# Patient Record
Sex: MALE
[De-identification: names, ages, dates, MRNs, and addresses within clinical notes are randomized per-mention and may not be internally consistent; named-entity substitution may affect disease eponyms.]

---

## 2022-09-26 PROBLEM — Z00.00 ENCOUNTER FOR PREVENTIVE HEALTH EXAMINATION: Status: ACTIVE | Noted: 2022-09-26

## 2022-09-27 ENCOUNTER — NON-APPOINTMENT (OUTPATIENT)
Age: 52
End: 2022-09-27

## 2022-09-27 ENCOUNTER — APPOINTMENT (OUTPATIENT)
Dept: CARDIOLOGY | Facility: CLINIC | Age: 52
End: 2022-09-27

## 2022-09-27 VITALS
DIASTOLIC BLOOD PRESSURE: 80 MMHG | BODY MASS INDEX: 34.25 KG/M2 | HEIGHT: 68 IN | WEIGHT: 226 LBS | OXYGEN SATURATION: 99 % | SYSTOLIC BLOOD PRESSURE: 148 MMHG | HEART RATE: 89 BPM

## 2022-09-27 DIAGNOSIS — I10 ESSENTIAL (PRIMARY) HYPERTENSION: ICD-10-CM

## 2022-09-27 PROCEDURE — 93000 ELECTROCARDIOGRAM COMPLETE: CPT

## 2022-09-27 PROCEDURE — 99204 OFFICE O/P NEW MOD 45 MIN: CPT | Mod: 25

## 2022-09-27 RX ORDER — AMLODIPINE BESYLATE 5 MG/1
5 TABLET ORAL DAILY
Refills: 0 | Status: ACTIVE | COMMUNITY
Start: 2022-09-27

## 2022-09-27 NOTE — DISCUSSION/SUMMARY
[FreeTextEntry1] : This is a 52-year-old gentleman with a past medical history significant for hypertension who presents to the office for cardiac evaluation.  His BP is usually controlled on amlodipine 5 QD, and he will continue it as written.  I am referring him for an echocardiogram to assess His cardiac structure and function, as well as an exercise stress test to assess His hemodynamic response to exercise, and to assess for the presence of obstructive coronary artery disease.  We discussed the benefits of a healthy diet, regular exercise and physical activity, and weight loss in detail.  I will call him with the results, and to discuss any necessary followup.

## 2022-09-27 NOTE — HISTORY OF PRESENT ILLNESS
[FreeTextEntry1] : This is a 52-year-old gentleman with a past medical history significant for hypertension who presents to the office for cardiac evaluation.  He reports just being diagnosed with hypertension, and being started on amlodipine.  He had been on 10 mg dose, but his lower extremity started to swell.  This was reduced to 5 mg, and he has no further lower extremity edema.  He is denying any cardiac symptoms, with no chest pain, dyspnea, PND, orthopnea, lower extremity swelling, dizziness, or syncope.  He is trying to eat better, and lose weight.  He is starting an exercise regimen.  His blood pressure is a bit high today, but he did not take his amlodipine yet.

## 2022-10-12 ENCOUNTER — APPOINTMENT (OUTPATIENT)
Dept: CARDIOLOGY | Facility: CLINIC | Age: 52
End: 2022-10-12

## 2022-10-12 PROCEDURE — 93015 CV STRESS TEST SUPVJ I&R: CPT

## 2022-10-12 PROCEDURE — 93306 TTE W/DOPPLER COMPLETE: CPT

## 2024-06-05 ENCOUNTER — NON-APPOINTMENT (OUTPATIENT)
Age: 54
End: 2024-06-05

## 2024-06-05 ENCOUNTER — APPOINTMENT (OUTPATIENT)
Dept: OPHTHALMOLOGY | Facility: CLINIC | Age: 54
End: 2024-06-05
Payer: COMMERCIAL

## 2024-06-05 PROCEDURE — 92020 GONIOSCOPY: CPT

## 2024-06-05 PROCEDURE — 99204 OFFICE O/P NEW MOD 45 MIN: CPT

## 2024-06-05 PROCEDURE — 76514 ECHO EXAM OF EYE THICKNESS: CPT

## 2024-06-05 PROCEDURE — 76513 OPH US DX ANT SGM US UNI/BI: CPT | Mod: LT

## 2024-06-05 PROCEDURE — 92133 CPTRZD OPH DX IMG PST SGM ON: CPT

## 2024-07-01 ENCOUNTER — NON-APPOINTMENT (OUTPATIENT)
Age: 54
End: 2024-07-01

## 2024-07-01 ENCOUNTER — APPOINTMENT (OUTPATIENT)
Dept: OPHTHALMOLOGY | Facility: CLINIC | Age: 54
End: 2024-07-01
Payer: COMMERCIAL

## 2024-07-01 PROCEDURE — 66761 REVISION OF IRIS: CPT | Mod: RT

## 2024-07-29 ENCOUNTER — NON-APPOINTMENT (OUTPATIENT)
Age: 54
End: 2024-07-29

## 2024-07-29 ENCOUNTER — APPOINTMENT (OUTPATIENT)
Dept: OPHTHALMOLOGY | Facility: CLINIC | Age: 54
End: 2024-07-29
Payer: COMMERCIAL

## 2024-07-29 PROCEDURE — 66761 REVISION OF IRIS: CPT | Mod: LT

## 2024-08-28 ENCOUNTER — NON-APPOINTMENT (OUTPATIENT)
Age: 54
End: 2024-08-28

## 2024-08-28 ENCOUNTER — APPOINTMENT (OUTPATIENT)
Dept: OPHTHALMOLOGY | Facility: CLINIC | Age: 54
End: 2024-08-28
Payer: COMMERCIAL

## 2024-08-28 PROCEDURE — 92020 GONIOSCOPY: CPT

## 2024-08-28 PROCEDURE — 92012 INTRM OPH EXAM EST PATIENT: CPT

## 2024-10-07 ENCOUNTER — RESULT REVIEW (OUTPATIENT)
Age: 54
End: 2024-10-07

## 2025-06-09 ENCOUNTER — DOCTOR'S OFFICE (OUTPATIENT)
Facility: LOCATION | Age: 55
Setting detail: OPHTHALMOLOGY
End: 2025-06-09
Payer: MEDICAID

## 2025-06-09 DIAGNOSIS — H25.13: ICD-10-CM

## 2025-06-09 DIAGNOSIS — H40.033: ICD-10-CM

## 2025-06-09 PROCEDURE — 92004 COMPRE OPH EXAM NEW PT 1/>: CPT | Performed by: OPHTHALMOLOGY

## 2025-06-09 ASSESSMENT — REFRACTION_CURRENTRX
OD_OVR_VA: 20/
OD_VPRISM_DIRECTION: PROGS
OD_ADD: +2.25
OD_SPHERE: +1.50
OS_ADD: +2.25
OD_CYLINDER: +0.50
OD_AXIS: 005
OS_SPHERE: +1.75
OS_OVR_VA: 20/
OS_VPRISM_DIRECTION: PROGS

## 2025-06-09 ASSESSMENT — KERATOMETRY
OD_K1POWER_DIOPTERS: 43.75
OD_AXISANGLE_DEGREES: 107
OS_AXISANGLE_DEGREES: 069
OD_K2POWER_DIOPTERS: 44.25
OS_K2POWER_DIOPTERS: 44.00
OS_K1POWER_DIOPTERS: 43.25

## 2025-06-09 ASSESSMENT — VISUAL ACUITY
OS_BCVA: 20/20
OD_BCVA: 20/200-1

## 2025-06-09 ASSESSMENT — REFRACTION_MANIFEST
OS_CYLINDER: -0.75
OS_SPHERE: +2.75
OS_VA1: 20/100
OS_AXIS: 100

## 2025-06-09 ASSESSMENT — REFRACTION_AUTOREFRACTION
OD_CYLINDER: +1.00
OD_SPHERE: +1.75
OS_SPHERE: +2.00
OD_AXIS: 175
OS_CYLINDER: +0.75
OS_AXIS: 010

## 2025-06-09 ASSESSMENT — CONFRONTATIONAL VISUAL FIELD TEST (CVF)
OD_FINDINGS: FULL
OS_FINDINGS: FULL

## 2025-06-12 ENCOUNTER — DOCTOR'S OFFICE (OUTPATIENT)
Facility: LOCATION | Age: 55
Setting detail: OPHTHALMOLOGY
End: 2025-06-12
Payer: MEDICAID

## 2025-06-12 DIAGNOSIS — H35.363: ICD-10-CM

## 2025-06-12 PROBLEM — H40.033 NARROW ANGLE; BOTH EYES: Status: ACTIVE | Noted: 2025-06-09

## 2025-06-12 PROBLEM — H35.30 MACULAR DEGENERATION UNSPECIFIED ; LEFT EYE: Status: ACTIVE | Noted: 2025-06-09

## 2025-06-12 PROBLEM — H54.52A2 LOW VISION LEFT EYE CATEGORY 2, NORMAL VISION RIGHT EYE: Status: ACTIVE | Noted: 2025-06-12

## 2025-06-12 PROBLEM — H25.13 CATARACT SENILE NUCLEAR SCLEROSIS; BOTH EYES: Status: ACTIVE | Noted: 2025-06-09

## 2025-06-12 PROCEDURE — 92012 INTRM OPH EXAM EST PATIENT: CPT | Performed by: OPHTHALMOLOGY

## 2025-06-12 PROCEDURE — 92134 CPTRZ OPH DX IMG PST SGM RTA: CPT | Performed by: OPHTHALMOLOGY

## 2025-06-12 ASSESSMENT — REFRACTION_AUTOREFRACTION
OD_CYLINDER: +1.00
OD_SPHERE: +1.75
OS_SPHERE: +2.00
OD_AXIS: 175
OS_AXIS: 010
OS_CYLINDER: +0.75

## 2025-06-12 ASSESSMENT — KERATOMETRY
OS_AXISANGLE_DEGREES: 069
OS_K1POWER_DIOPTERS: 43.25
OD_K1POWER_DIOPTERS: 43.75
OS_K2POWER_DIOPTERS: 44.00
OD_AXISANGLE_DEGREES: 107
OD_K2POWER_DIOPTERS: 44.25

## 2025-06-12 ASSESSMENT — VISUAL ACUITY
OD_BCVA: 20/200-1
OS_BCVA: 20/20

## 2025-07-14 ENCOUNTER — INPATIENT (INPATIENT)
Facility: HOSPITAL | Age: 55
LOS: 2 days | Discharge: HOME | DRG: 199 | End: 2025-07-17
Attending: SPECIALIST | Admitting: SPECIALIST
Payer: COMMERCIAL

## 2025-07-14 VITALS
HEART RATE: 76 BPM | SYSTOLIC BLOOD PRESSURE: 135 MMHG | WEIGHT: 219.14 LBS | OXYGEN SATURATION: 100 % | DIASTOLIC BLOOD PRESSURE: 85 MMHG | RESPIRATION RATE: 18 BRPM | TEMPERATURE: 99 F

## 2025-07-14 DIAGNOSIS — I10 ESSENTIAL (PRIMARY) HYPERTENSION: ICD-10-CM

## 2025-07-14 LAB
ALBUMIN SERPL ELPH-MCNC: 4.5 G/DL — SIGNIFICANT CHANGE UP (ref 3.3–5)
ALP SERPL-CCNC: 76 U/L — SIGNIFICANT CHANGE UP (ref 40–120)
ALT FLD-CCNC: 70 U/L — SIGNIFICANT CHANGE UP (ref 12–78)
ANION GAP SERPL CALC-SCNC: 8 MMOL/L — SIGNIFICANT CHANGE UP (ref 5–17)
APTT BLD: 29.4 SEC — SIGNIFICANT CHANGE UP (ref 26.1–36.8)
AST SERPL-CCNC: 53 U/L — HIGH (ref 15–37)
BASOPHILS # BLD AUTO: 0.04 K/UL — SIGNIFICANT CHANGE UP (ref 0–0.2)
BASOPHILS NFR BLD AUTO: 0.8 % — SIGNIFICANT CHANGE UP (ref 0–2)
BILIRUB SERPL-MCNC: 0.4 MG/DL — SIGNIFICANT CHANGE UP (ref 0.2–1.2)
BUN SERPL-MCNC: 16 MG/DL — SIGNIFICANT CHANGE UP (ref 7–23)
CALCIUM SERPL-MCNC: 9.4 MG/DL — SIGNIFICANT CHANGE UP (ref 8.5–10.1)
CHLORIDE SERPL-SCNC: 104 MMOL/L — SIGNIFICANT CHANGE UP (ref 96–108)
CO2 SERPL-SCNC: 24 MMOL/L — SIGNIFICANT CHANGE UP (ref 22–31)
CREAT SERPL-MCNC: 1.31 MG/DL — HIGH (ref 0.5–1.3)
EGFR: 65 ML/MIN/1.73M2 — SIGNIFICANT CHANGE UP
EGFR: 65 ML/MIN/1.73M2 — SIGNIFICANT CHANGE UP
EOSINOPHIL # BLD AUTO: 0.16 K/UL — SIGNIFICANT CHANGE UP (ref 0–0.5)
EOSINOPHIL NFR BLD AUTO: 3.1 % — SIGNIFICANT CHANGE UP (ref 0–6)
GLUCOSE SERPL-MCNC: 103 MG/DL — HIGH (ref 70–99)
HCT VFR BLD CALC: 42.5 % — SIGNIFICANT CHANGE UP (ref 39–50)
HGB BLD-MCNC: 14.2 G/DL — SIGNIFICANT CHANGE UP (ref 13–17)
IMM GRANULOCYTES # BLD AUTO: 0.01 K/UL — SIGNIFICANT CHANGE UP (ref 0–0.07)
IMM GRANULOCYTES NFR BLD AUTO: 0.2 % — SIGNIFICANT CHANGE UP (ref 0–0.9)
INR BLD: 0.91 RATIO — SIGNIFICANT CHANGE UP (ref 0.85–1.16)
LYMPHOCYTES # BLD AUTO: 1.85 K/UL — SIGNIFICANT CHANGE UP (ref 1–3.3)
LYMPHOCYTES NFR BLD AUTO: 36.3 % — SIGNIFICANT CHANGE UP (ref 13–44)
MAGNESIUM SERPL-MCNC: 2.7 MG/DL — HIGH (ref 1.6–2.6)
MCHC RBC-ENTMCNC: 29.1 PG — SIGNIFICANT CHANGE UP (ref 27–34)
MCHC RBC-ENTMCNC: 33.4 G/DL — SIGNIFICANT CHANGE UP (ref 32–36)
MCV RBC AUTO: 87.1 FL — SIGNIFICANT CHANGE UP (ref 80–100)
MONOCYTES # BLD AUTO: 0.38 K/UL — SIGNIFICANT CHANGE UP (ref 0–0.9)
MONOCYTES NFR BLD AUTO: 7.5 % — SIGNIFICANT CHANGE UP (ref 2–14)
NEUTROPHILS # BLD AUTO: 2.65 K/UL — SIGNIFICANT CHANGE UP (ref 1.8–7.4)
NEUTROPHILS NFR BLD AUTO: 52.1 % — SIGNIFICANT CHANGE UP (ref 43–77)
NRBC # BLD AUTO: 0 K/UL — SIGNIFICANT CHANGE UP (ref 0–0)
NRBC # FLD: 0 K/UL — SIGNIFICANT CHANGE UP (ref 0–0)
NRBC BLD AUTO-RTO: 0 /100 WBCS — SIGNIFICANT CHANGE UP (ref 0–0)
PLATELET # BLD AUTO: 171 K/UL — SIGNIFICANT CHANGE UP (ref 150–400)
PMV BLD: 12.1 FL — SIGNIFICANT CHANGE UP (ref 7–13)
POTASSIUM SERPL-MCNC: 4.2 MMOL/L — SIGNIFICANT CHANGE UP (ref 3.5–5.3)
POTASSIUM SERPL-SCNC: 4.2 MMOL/L — SIGNIFICANT CHANGE UP (ref 3.5–5.3)
PROT SERPL-MCNC: 8.4 GM/DL — HIGH (ref 6–8.3)
PROTHROM AB SERPL-ACNC: 10.7 SEC — SIGNIFICANT CHANGE UP (ref 9.9–13.4)
RBC # BLD: 4.88 M/UL — SIGNIFICANT CHANGE UP (ref 4.2–5.8)
RBC # FLD: 13.2 % — SIGNIFICANT CHANGE UP (ref 10.3–14.5)
SODIUM SERPL-SCNC: 136 MMOL/L — SIGNIFICANT CHANGE UP (ref 135–145)
TROPONIN I, HIGH SENSITIVITY RESULT: 4.64 NG/L — SIGNIFICANT CHANGE UP
WBC # BLD: 5.09 K/UL — SIGNIFICANT CHANGE UP (ref 3.8–10.5)
WBC # FLD AUTO: 5.09 K/UL — SIGNIFICANT CHANGE UP (ref 3.8–10.5)

## 2025-07-14 PROCEDURE — 84305 ASSAY OF SOMATOMEDIN: CPT

## 2025-07-14 PROCEDURE — 70498 CT ANGIOGRAPHY NECK: CPT | Mod: 26

## 2025-07-14 PROCEDURE — 84402 ASSAY OF FREE TESTOSTERONE: CPT

## 2025-07-14 PROCEDURE — 99223 1ST HOSP IP/OBS HIGH 75: CPT

## 2025-07-14 PROCEDURE — 84443 ASSAY THYROID STIM HORMONE: CPT

## 2025-07-14 PROCEDURE — 86901 BLOOD TYPING SEROLOGIC RH(D): CPT

## 2025-07-14 PROCEDURE — 84481 FREE ASSAY (FT-3): CPT

## 2025-07-14 PROCEDURE — 70553 MRI BRAIN STEM W/O & W/DYE: CPT

## 2025-07-14 PROCEDURE — 83036 HEMOGLOBIN GLYCOSYLATED A1C: CPT

## 2025-07-14 PROCEDURE — 84480 ASSAY TRIIODOTHYRONINE (T3): CPT

## 2025-07-14 PROCEDURE — 80048 BASIC METABOLIC PNL TOTAL CA: CPT

## 2025-07-14 PROCEDURE — 99285 EMERGENCY DEPT VISIT HI MDM: CPT

## 2025-07-14 PROCEDURE — 82533 TOTAL CORTISOL: CPT

## 2025-07-14 PROCEDURE — 93010 ELECTROCARDIOGRAM REPORT: CPT

## 2025-07-14 PROCEDURE — 71045 X-RAY EXAM CHEST 1 VIEW: CPT

## 2025-07-14 PROCEDURE — 86900 BLOOD TYPING SEROLOGIC ABO: CPT

## 2025-07-14 PROCEDURE — 83002 ASSAY OF GONADOTROPIN (LH): CPT

## 2025-07-14 PROCEDURE — 70496 CT ANGIOGRAPHY HEAD: CPT | Mod: 26

## 2025-07-14 PROCEDURE — 71045 X-RAY EXAM CHEST 1 VIEW: CPT | Mod: 26

## 2025-07-14 PROCEDURE — 83520 IMMUNOASSAY QUANT NOS NONAB: CPT

## 2025-07-14 PROCEDURE — 82672 ASSAY OF ESTROGEN: CPT

## 2025-07-14 PROCEDURE — A9579: CPT

## 2025-07-14 PROCEDURE — 86850 RBC ANTIBODY SCREEN: CPT

## 2025-07-14 PROCEDURE — 36415 COLL VENOUS BLD VENIPUNCTURE: CPT

## 2025-07-14 PROCEDURE — 84403 ASSAY OF TOTAL TESTOSTERONE: CPT

## 2025-07-14 PROCEDURE — 82024 ASSAY OF ACTH: CPT

## 2025-07-14 PROCEDURE — 84146 ASSAY OF PROLACTIN: CPT

## 2025-07-14 PROCEDURE — 84439 ASSAY OF FREE THYROXINE: CPT

## 2025-07-14 PROCEDURE — 84436 ASSAY OF TOTAL THYROXINE: CPT

## 2025-07-14 RX ORDER — ACETAMINOPHEN 500 MG/5ML
650 LIQUID (ML) ORAL EVERY 6 HOURS
Refills: 0 | Status: DISCONTINUED | OUTPATIENT
Start: 2025-07-14 | End: 2025-07-17

## 2025-07-14 RX ORDER — METOCLOPRAMIDE HCL 10 MG
10 TABLET ORAL ONCE
Refills: 0 | Status: COMPLETED | OUTPATIENT
Start: 2025-07-14 | End: 2025-07-14

## 2025-07-14 RX ORDER — ACETAMINOPHEN 500 MG/5ML
1000 LIQUID (ML) ORAL ONCE
Refills: 0 | Status: COMPLETED | OUTPATIENT
Start: 2025-07-14 | End: 2025-07-14

## 2025-07-14 RX ADMIN — Medication 10 MILLIGRAM(S): at 20:02

## 2025-07-14 RX ADMIN — Medication 400 MILLIGRAM(S): at 20:02

## 2025-07-14 NOTE — ED STATDOCS - CLINICAL SUMMARY MEDICAL DECISION MAKING FREE TEXT BOX
54 y/o male with no pertinent PMHx presents to the ED c/o constant headache for 4 days. Pt also c/o vision loss in left eye for months, states he only sees shapes.  patient hemodynamically stable with otherwise normal neuroexam.  Will perform focused eye exam including eye pressures to rule out glaucoma, visual acuity although these eye complaints are subacute.  Differential includes not limited to complex migraine, subacute stroke to the eye.  Plan for CTA head neck, labs, pain control.  Reassess

## 2025-07-14 NOTE — ED ADULT NURSE NOTE - OBJECTIVE STATEMENT
The pt is a 54 y/o male A&OX4 presenting to the ED c/o headache x4 days with vision changes to L eye. Pt endorsing that "he saw his doctor in regards to this issue and was sent in for scans". Pt denies N/V/D or fever. Respirations even and unlabored, no distress noted at this time.

## 2025-07-14 NOTE — ED ADULT NURSE NOTE - NS ED NURSE LEVEL OF CONSCIOUSNESS MENTAL STATUS
Contacted emergency contact and scheduled pre-op w/MGM for patient on 10/6/20.  Ms. Clarke stated that patient often misplaces phone, but she will relay message and ensure he comes to appointment.  
Awake/Alert/Cooperative

## 2025-07-14 NOTE — H&P ADULT - HISTORY OF PRESENT ILLNESS
56 y/o male with no pertinent PMHx presents to the ED c/o constant headache for 4 days. Pt also c/o vision loss in left eye for months, states he only sees shapes. Denies left eye pain. Pt states he saw an ophthalmologist who said there is no problem with his eye, recommended MRI.  Presented with MR from [place] demonstrating a pitutiary mass.  CTH in ED confirms mass.  Patient with son at bedside for translation, denied  for this encounter.  States his left eye has had visual changes for about 1 year, no pain associated with visual loss. Headaches for about 4 days.  Denies fatigue, weakness, nausea, palpitations, hair loss.

## 2025-07-14 NOTE — H&P ADULT - ASSESSMENT
54 y/o male with no pertinent PMHx presents to the ED c/o constant headache for 4 days. Pt also c/o vision loss in left eye for months, states he only sees shapes. Denies left eye pain. Pt states he saw an ophthalmologist who said there is no problem with his eye, recommended MRI.  Presented with MR from [facility] [place] demonstrating a pitutiary mass.  CTH in ED confirms mass.  Patient with son at bedside for translation, denied  for this encounter.  States his left eye has had visual changes for about 1 year, no pain associated with visual loss. Headaches for about 4 days.  Denies fatigue, weakness, nausea, palpitations, hair loss.      - admit med surg  / 3N to neurosurgery  - Q 4 neuro checks  - MR Sella with/without contrast  - MR brain with/without contrast  - Endocrine lab-work in am  - Medicine consult for clearance  - diet as tolerated  - pain control     dw Dr Avalos    45 minutes spent on total encounter; more than 50% of the visit was spent counseling and / or coordinating care by the Neurosurgical team.  While the remainder including evaluating the patient, medical record, imaging studies, and discussions with fellow staff members / patient / and or family.

## 2025-07-14 NOTE — ED ADULT TRIAGE NOTE - CHIEF COMPLAINT QUOTE
pt ambulatory to ED for headache x4 days. reports decreased left eye vision decreased and is very blurry and is waiting for outpatient MRI but unable to get one soon.

## 2025-07-14 NOTE — ED STATDOCS - OBJECTIVE STATEMENT
56 y/o male with no pertinent PMHx presents to the ED c/o constant headache for 4 days. Pt also c/o vision loss in left eye for months, states he only sees shapes. Denies left eye pain. Pt states he saw an ophthalmologist who said there is no problem with his eye, recommended MRI.      #[id number]

## 2025-07-14 NOTE — ED STATDOCS - PROGRESS NOTE DETAILS
Received call from radiology, patient with a large pituitary adenoma that is pressing on his optic chiasm.  Neurosurgery consulted and will see patient shortly. patient seen by neurosurgery to be admitted for further management -Lisette Rosario PA-C

## 2025-07-14 NOTE — H&P ADULT - NSHPPHYSICALEXAM_GEN_ALL_CORE
Physical Exam:  Constitutional: Awake / alert  HEENT: PERRLA, EOMI  Neck: Supple  Respiratory: Breath sounds are clear bilaterally  Cardiovascular: regular rhythm  Gastrointestinal: Soft, NT/ND  Extremities:  no edema  Musculoskeletal: no abnormal movements  Skin: No rashes    Neurological Exam:  HF: A x O x 3, appropriately interactive, normal affect, speech fluent, no aphasia or paraphasic errors. Naming /repetition intact   CN: PERRL, EOMI, Right eye visual fields full, Left eye can count fingers, see shapes, can not see color.  Facial sensation normal, no NLFD, tongue midline  Motor: No pronator drift, Strength 5/5 in all 4 ext, normal bulk and tone, no tremor, rigidity or bradykinesia  Sens: Intact to light touch

## 2025-07-14 NOTE — PHARMACOTHERAPY INTERVENTION NOTE - COMMENTS
Medication reconciliation completed.  Reviewed Medication list and confirmed med allergies with patient; confirmed with Dr. First Medjonny.

## 2025-07-14 NOTE — H&P ADULT - NSHPLABSRESULTS_GEN_ALL_CORE
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   Recommend follow-up dedicated pre and postcontrast MR imaging of the   pituitary region.    < end of copied text >                          14.2   5.09  )-----------( 171      ( 14 Jul 2025 19:46 )             42.5     07-14    136  |  104  |  16  ----------------------------<  103[H]  4.2   |  24  |  1.31[H]    Ca    9.4      14 Jul 2025 19:46  Mg     2.7     07-14    TPro  8.4[H]  /  Alb  4.5  /  TBili  0.4  /  DBili  x   /  AST  53[H]  /  ALT  70  /  AlkPhos  76  07-14

## 2025-07-14 NOTE — ED STATDOCS - PHYSICAL EXAMINATION
GEN: Patient awake alert NAD.   HEENT: normocephalic, atraumatic, EOMI, no scleral icterus, moist MM  CARDIAC: RRR, S1, S2, no murmur.   PULM: CTA B/L no wheeze, rhonchi, rales.   ABD: soft NT, ND, no rebound no guarding, no CVA tenderness.   MSK: Moving all extremities, no edema. 5/5 strength and full ROM in all extremities.     NEURO: A&Ox3, gait normal, no focal neurological deficits, CN 2-12 grossly intact  SKIN: warm, dry, no rash. GEN: Patient awake alert NAD.   HEENT: normocephalic, atraumatic, EOMI, PERRL, no visual field cuts no scleral icterus, moist MM  CARDIAC: RRR, S1, S2, no murmur.   PULM: CTA B/L no wheeze, rhonchi, rales.   ABD: soft NT, ND, no rebound no guarding, no CVA tenderness.   MSK: Moving all extremities, no edema. 5/5 strength and full ROM in all extremities.     NEURO: A&Ox3, gait normal, no focal neurological deficits, CN 2-12 grossly intact, , no symmetric, dysarthria, aphasia, pronator drift.  Normal sensation  SKIN: warm, dry, no rash.

## 2025-07-15 LAB
A1C WITH ESTIMATED AVERAGE GLUCOSE RESULT: 5.7 % — HIGH (ref 4–5.6)
ACTH SER-ACNC: 34.6 PG/ML — SIGNIFICANT CHANGE UP (ref 7.2–63.3)
ANION GAP SERPL CALC-SCNC: 6 MMOL/L — SIGNIFICANT CHANGE UP (ref 5–17)
BLD GP AB SCN SERPL QL: SIGNIFICANT CHANGE UP
BUN SERPL-MCNC: 14 MG/DL — SIGNIFICANT CHANGE UP (ref 7–23)
CALCIUM SERPL-MCNC: 9.5 MG/DL — SIGNIFICANT CHANGE UP (ref 8.5–10.1)
CHLORIDE SERPL-SCNC: 106 MMOL/L — SIGNIFICANT CHANGE UP (ref 96–108)
CO2 SERPL-SCNC: 24 MMOL/L — SIGNIFICANT CHANGE UP (ref 22–31)
CORTIS AM PEAK SERPL-MCNC: 10.5 UG/DL — SIGNIFICANT CHANGE UP (ref 6–18.4)
CREAT SERPL-MCNC: 1.24 MG/DL — SIGNIFICANT CHANGE UP (ref 0.5–1.3)
EGFR: 70 ML/MIN/1.73M2 — SIGNIFICANT CHANGE UP
EGFR: 70 ML/MIN/1.73M2 — SIGNIFICANT CHANGE UP
ESTIMATED AVERAGE GLUCOSE: 117 MG/DL — HIGH (ref 68–114)
GLUCOSE SERPL-MCNC: 101 MG/DL — HIGH (ref 70–99)
LH SERPL-ACNC: 4.1 IU/L — SIGNIFICANT CHANGE UP
POTASSIUM SERPL-MCNC: 4.1 MMOL/L — SIGNIFICANT CHANGE UP (ref 3.5–5.3)
POTASSIUM SERPL-SCNC: 4.1 MMOL/L — SIGNIFICANT CHANGE UP (ref 3.5–5.3)
PROLACTIN SERPL-MCNC: 26.2 NG/ML — HIGH (ref 4.1–18.4)
SODIUM SERPL-SCNC: 136 MMOL/L — SIGNIFICANT CHANGE UP (ref 135–145)
T3FREE SERPL-MCNC: 2.08 PG/ML — SIGNIFICANT CHANGE UP (ref 2–4.4)
T4 AB SER-ACNC: 5.1 UG/DL — SIGNIFICANT CHANGE UP (ref 4.6–12)
T4 FREE SERPL-MCNC: 0.72 NG/DL — LOW (ref 0.76–1.46)
TSH SERPL-MCNC: 3.3 UU/ML — SIGNIFICANT CHANGE UP (ref 0.34–4.82)

## 2025-07-15 PROCEDURE — 70553 MRI BRAIN STEM W/O & W/DYE: CPT | Mod: 26,76

## 2025-07-15 PROCEDURE — 99231 SBSQ HOSP IP/OBS SF/LOW 25: CPT

## 2025-07-15 PROCEDURE — 99222 1ST HOSP IP/OBS MODERATE 55: CPT

## 2025-07-15 NOTE — CONSULT NOTE ADULT - ASSESSMENT
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  - F/U MRI pending  - Neurosurgery on board for intervention  - Medical Optimization; RCRI: 0 points. Patient able to tolerate > 4 METS of activity at baseline without any symptoms. The patient is medically optimized to proceed to the OR.     #HTN  B/P controlled off home Norvasc   - Cont to monitor    #CKD  Serum Cr appears to be at baseline compared to 2023  - F/u BMP    #DVT Ppx  - SCDs

## 2025-07-15 NOTE — PATIENT PROFILE ADULT - TOBACCO USE
Pre-charting complete.     Josefina Paez is here today for Office Visit (w/ f/u), Medicare Wellness Visit, Hypertension, Hypothyroidism, and Chronic Kidney Disease     Medications: medications verified and updated    Refills needed today?  NO    Tobacco history: verified    Patient would like communication of their results via:    Home Phone: [phone] (home)  Okay to leave a message containing results? Yes    Health Maintenance Due   Topic Date Due   • DM/CKD Microalbumin  Never done   • Medicare Wellness Visit  01/15/2022   • Depression Screening  07/15/2022       Patient is due for topics as listed above but is not proceeding with DM/CKD Microalbumin and MWV (Medicare Wellness Visit) at this time.       Unaddressed Risk Adjusted HCC Categories and Diagnoses   - Chronic Kidney Disease, Moderate (Stage 3)  - Unaddressed Dx:Stage 3a Chronic Kidney Disease (Cms/Hcc)         Never smoker

## 2025-07-15 NOTE — PATIENT PROFILE ADULT - FALL HARM RISK - UNIVERSAL INTERVENTIONS
- A1c 6.3   Diet: consistent carb Bed in lowest position, wheels locked, appropriate side rails in place/Call bell, personal items and telephone in reach/Instruct patient to call for assistance before getting out of bed or chair/Non-slip footwear when patient is out of bed/[place] to call system/Physically safe environment - no spills, clutter or unnecessary equipment/Purposeful Proactive Rounding/Room/bathroom lighting operational, light cord in reach Patient with history of CABG in 2002   - no history of stents   - will hold off on antiplatelet and anticoagulation given new component of chronic subdural hematoma

## 2025-07-15 NOTE — CONSULT NOTE ADULT - SUBJECTIVE AND OBJECTIVE BOX
PCP: Taylor Hong    CHIEF COMPLAINT:     HISTORY OF THE PRESENT ILLNESS:  56 y/o male with PMH of Hypertension presented to the ED c/o constant headache for the preceding 4 days. Patient also stated that he had worsening blurring vision of the left eye for the past 1 year. He states that he can only shapes/contours of images. Denies left eye pain. Pt states he saw an ophthalmologist who said there is no problem with his eye, recommended MRI.  Presented with MR from [place] demonstrating a pitutiary mass.  CTH in ED confirms mass.  Denies any fevers, chills, chest pain, SOB, abdominal pain, numbness/tingling or focal neurological changes, nausea, palpitations, hair loss.      Interpretation provided by [employer]; Yifan (ID# [id number])    PAST MEDICAL HISTORY:    PAST SURGICAL HISTORY:    FAMILY HISTORY:   non-contributory to the patient's current presentation    SOCIAL HISTORY:  no smoking, no alcohol, no drugs    REVIEW OF SYSTEMS:   All 10 systems reviewed in detailed and found to be negative with the exception of what has already been described above    MEDICATIONS  (STANDING):    MEDICATIONS  (PRN):  acetaminophen     Tablet .. 650 milliGRAM(s) Oral every 6 hours PRN Mild Pain (1 - 3)      HEENT:  pupils equal and reactive, EOMI, no oropharyngeal lesions, erythema, exudates, oral thrush  NECK:   supple, no carotid bruits  CV:  +S1, +S2, regular, no murmurs  RESP:   lungs clear to auscultation bilaterally, no wheezing, rales, rhonchi, good air entry bilaterally  GI:  abdomen soft, non-tender, non-distended, normal BS  EXT:  no clubbing, no cyanosis, no edema, no calf pain, swelling or erythema  NEURO:  AAOX3, NOVA 5/5  SKIN:  no ulcers, lesions or rashes    LABS:                          14.2   5.09  )-----------( 171      ( 14 Jul 2025 19:46 )             42.5     07-14    136  |  104  |  16  ----------------------------<  103[H]  4.2   |  24  |  1.31[H]    Ca    9.4      14 Jul 2025 19:46  Mg     2.7     07-14    TPro  8.4[H]  /  Alb  4.5  /  TBili  0.4  /  DBili  x   /  AST  53[H]  /  ALT  70  /  AlkPhos  76  07-14        LIVER FUNCTIONS - ( 14 Jul 2025 19:46 )  Alb: 4.5 g/dL / Pro: 8.4 gm/dL / ALK PHOS: 76 U/L / ALT: 70 U/L / AST: 53 U/L / GGT: x           PT/INR - ( 14 Jul 2025 19:46 )   PT: 10.7 sec;   INR: 0.91 ratio         PTT - ( 14 Jul 2025 19:46 )  PTT:29.4 sec    Urinalysis Basic - ( 14 Jul 2025 19:46 )    Color: x / Appearance: x / SG: x / pH: x  Gluc: 103 mg/dL / Ketone: x  / Bili: x / Urobili: x   Blood: x / Protein: x / Nitrite: x   Leuk Esterase: x / RBC: x / WBC x   Sq Epi: x / Non Sq Epi: x / Bacteria: x            Troponin Trend:

## 2025-07-15 NOTE — PATIENT PROFILE ADULT - FALL HARM RISK - ATTEMPT OOB
Not on home meds  - c/w ace-inhibitor and low dose metoprolol tartrate started on admission Newly diagnosed hypertension, likely long standing, however patient is not on medications.   - c/w ace-inhibitor and low dose metoprolol tartrate started on admission No

## 2025-07-16 LAB
ADD ON TEST-SPECIMEN IN LAB: SIGNIFICANT CHANGE UP
INSULIN-LIKE GROWTH FACTOR 1 INTERPRETATION: SIGNIFICANT CHANGE UP
INSULIN-LIKE GROWTH FACTOR 1: 66 NG/ML — SIGNIFICANT CHANGE UP (ref 64–218)
TESTOST FREE+TOTAL PANEL SERPL-MCNC: 97 NG/DL — LOW (ref 300–740)

## 2025-07-16 NOTE — PROGRESS NOTE ADULT - ASSESSMENT
54 y/o male with no pertinent PMHx presents to the ED c/o constant headache for 4 days. Pt also c/o vision loss in left eye for months, states he only sees shapes. Denies left eye pain. Pt states he saw an ophthalmologist who said there is no problem with his eye, recommended MRI.  Presented with MR from [place] demonstrating a pitutiary mass.  CTH in ED confirms mass.  Patient with son at bedside for translation, denied  for this encounter.  States his left eye has had visual changes for about 1 year, no pain associated with visual loss. Headaches for about 4 days.  Denies fatigue, weakness, nausea, palpitations, hair loss.        - Q 4 neuro checks  - MR reviewed  - Endocrine lab-work reviewed, free testosterone pending  - Hospitalist following for medical clearance (home norvasc on hold - BP stable)  - diet as tolerated  - pain control as needed   - Ambulate as tolerated.    Surgical plan to be determined.  Dr avalos communicating with ENT at [facility] for timing.     mari Avalos
56 y/o male with no pertinent PMHx presents to the ED c/o constant headache for 4 days. Pt also c/o vision loss in left eye for months, states he only sees shapes. Denies left eye pain. Pt states he saw an ophthalmologist who said there is no problem with his eye, recommended MRI.  Presented with MR from [place] demonstrating a pitutiary mass.  CTH in ED confirms mass.  Patient with son at bedside for translation, denied  for this encounter.  States his left eye has had visual changes for about 1 year, no pain associated with visual loss. Headaches for about 4 days.  Denies fatigue, weakness, nausea, palpitations, hair loss.        - Q 4 neuro checks  - MR Sella with/without contrast  - MR brain with/without contrast  - Endocrine lab-work pending   - Medicine consult for clearance  - diet as tolerated  - pain control as needed     Surgical plan to be discussed with patient after MRI is completed     mari Avalos

## 2025-07-16 NOTE — PROGRESS NOTE ADULT - SUBJECTIVE AND OBJECTIVE BOX
HPI: 54 y/o male with no pertinent PMHx presents to the ED c/o constant headache for 4 days. Pt also c/o vision loss in left eye for months, states he only sees shapes. Denies left eye pain. Pt states he saw an ophthalmologist who said there is no problem with his eye, recommended MRI.  Presented with MR from [place] demonstrating a pitutiary mass.  CTH in ED confirms mass.  Patient with son at bedside for translation, denied  for this encounter.  States his left eye has had visual changes for about 1 year, no pain associated with visual loss. Headaches for about 4 days.  Denies fatigue, weakness, nausea, palpitations, hair loss.      7/15- Pt seen and examined on 3E, no events overnight, vision still limited in left eye, pt appears comfortable in bed, pending MRI today, Endocrine labs ordered.     Vital Signs Last 24 Hrs  T(C): 36.5 (15 Jul 2025 08:40), Max: 37 (14 Jul 2025 16:58)  T(F): 97.7 (15 Jul 2025 08:40), Max: 98.6 (14 Jul 2025 16:58)  HR: 70 (15 Jul 2025 08:40) (66 - 84)  BP: 125/76 (15 Jul 2025 08:40) (124/85 - 155/85)  BP(mean): 100 (14 Jul 2025 16:58) (100 - 100)  RR: 19 (15 Jul 2025 08:40) (18 - 19)  SpO2: 96% (15 Jul 2025 08:40) (96% - 100%)    Parameters below as of 15 Jul 2025 08:40  Patient On (Oxygen Delivery Method): room air    MEDICATIONS  (STANDING):  none    MEDICATIONS  (PRN):  acetaminophen  Tablet .. 650 milliGRAM(s) Oral every 6 hours PRN Mild Pain (1 - 3)    ROS: pertinent positives in HPI, all other ROS were reviewed and are negative     PHYSICAL EXAM:  Constitutional: Awake / alert, NAD  HEENT: Atraumatic, PERRLA, EOMI, hearing intact   Neck: Supple  Respiratory: Breath sounds are clear bilaterally  Cardiovascular: regular rhythm  Gastrointestinal: Soft, NT/ND  Extremities:  no edema  Musculoskeletal: no abnormal movements  Skin: No rashes    Neurological Exam:  HF: A x O x 3, appropriately interactive, normal affect, speech fluent, no aphasia or paraphasic errors (in Bahamian), naming/repetition intact   CN: PERRL, EOMI, Right eye visual fields full, Left eye can count fingers, see shapes, can not see color.  Facial sensation normal, no NLFD, tongue midline  Motor: No pronator drift, Strength 5/5 in all 4 ext, normal bulk and tone, no tremor, rigidity or bradykinesia  Sens: Intact to light touch  Gait: not tested     LABS:                         14.2   5.09  )-----------( 171      ( 14 Jul 2025 19:46 )             42.5     07-14    136  |  104  |  16  ----------------------------<  103[H]  4.2   |  24  |  1.31[H]    Ca    9.4      14 Jul 2025 19:46  Mg     2.7     07-14    TPro  8.4[H]  /  Alb  4.5  /  TBili  0.4  /  DBili  x   /  AST  53[H]  /  ALT  70  /  AlkPhos  76  07-14    LIVER FUNCTIONS - ( 14 Jul 2025 19:46 )  Alb: 4.5 g/dL / Pro: 8.4 gm/dL / ALK PHOS: 76 U/L / ALT: 70 U/L / AST: 53 U/L / GGT: x           RADIOLOGY:  < from: CT Angio Head w/ IV Cont (07.14.25 @ 21:19) >  CT HEAD:  Prominent "figure-of-eight "mass lesion involving the sella turcica and   suprasellar regions, 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   Recommend follow-up dedicated pre and postcontrast MR imaging of the pituitary region.    CTA NECK:  No evidence of significant stenosis or occlusion.    CTA HEAD: Please note that there does appear to be mass effect on the   optic chiasm.  No large vessel occlusion, significant stenosis or vascular abnormality identified.    
HPI: 56 y/o male with no pertinent PMHx presents to the ED c/o constant headache for 4 days. Pt also c/o vision loss in left eye for months, states he only sees shapes. Denies left eye pain. Pt states he saw an ophthalmologist who said there is no problem with his eye, recommended MRI.  Presented with MR from [place] demonstrating a pitutiary mass.  CTH in ED confirms mass.  Patient with son at bedside for translation, denied  for this encounter.  States his left eye has had visual changes for about 1 year, no pain associated with visual loss. Headaches for about 4 days.  Denies fatigue, weakness, nausea, palpitations, hair loss.      7/15- Pt seen and examined on 3E, no events overnight, vision still limited in left eye, pt appears comfortable in bed, pending MRI today, Endocrine labs ordered.     7/16:  [id number], Patient seen and examined, wife at bedside.  MR imaging done and reviewed with patient and family.  Discussed surgical plan, no neurologic changes from baseline LT eye.  Dr Avalos will discuss with ENT about OR.     Vital Signs Last 24 Hrs  T(C): 36.7 (16 Jul 2025 08:00), Max: 36.8 (15 Jul 2025 20:56)  T(F): 98.1 (16 Jul 2025 08:00), Max: 98.3 (15 Jul 2025 20:56)  HR: 64 (16 Jul 2025 08:00) (64 - 70)  BP: 125/88 (16 Jul 2025 08:00) (117/78 - 131/73)  BP(mean): --  RR: 18 (16 Jul 2025 08:00) (18 - 19)  SpO2: 99% (16 Jul 2025 08:00) (96% - 99%)    Parameters below as of 16 Jul 2025 08:00  Patient On (Oxygen Delivery Method): room air      MEDICATIONS  (STANDING):  Home Norvasc on hold.    MEDICATIONS  (PRN):  acetaminophen     Tablet .. 650 milliGRAM(s) Oral every 6 hours PRN Mild Pain (1 - 3)      ROS: pertinent positives in HPI, all other ROS were reviewed and are negative     PHYSICAL EXAM:  Constitutional: Awake / alert, NAD  HEENT: Atraumatic, PERRLA, EOMI, hearing intact   Neck: Supple  Respiratory: Breath sounds are clear bilaterally  Cardiovascular: regular rhythm  Gastrointestinal: Soft, NT/ND  Extremities:  no edema  Musculoskeletal: no abnormal movements  Skin: No rashes    Neurological Exam:  HF: A x O x 3, appropriately interactive, normal affect, speech fluent, no aphasia or paraphasic errors (in [place]ian), naming/repetition intact   CN: PERRL, EOMI, Right eye visual fields full, Left eye can count fingers, see shapes, can not see color.  Facial sensation normal, no NLFD, tongue midline  Motor: No pronator drift, Strength 5/5 in all 4 ext, normal bulk and tone, no tremor, rigidity or bradykinesia  Sens: Intact to light touch  Gait: not tested     LABS:                                    14.2   5.09  )-----------( 171      ( 14 Jul 2025 19:46 )             42.5     07-15    136  |  106  |  14  ----------------------------<  101[H]  4.1   |  24  |  1.24    Ca    9.5      15 Jul 2025 12:10  Mg     2.7     07-14    TPro  8.4[H]  /  Alb  4.5  /  TBili  0.4  /  DBili  x   /  AST  53[H]  /  ALT  70  /  AlkPhos  76  07-14                       RADIOLOGY:  < from: CT Angio Head w/ IV Cont (07.14.25 @ 21:19) >  CT HEAD:  Prominent "figure-of-eight "mass lesion involving the sella turcica and   suprasellar regions, 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   Recommend follow-up dedicated pre and postcontrast MR imaging of the pituitary region.    CTA NECK:  No evidence of significant stenosis or occlusion.    CTA HEAD: Please note that there does appear to be mass effect on the   optic chiasm.  No large vessel occlusion, significant stenosis or vascular abnormality identified.      < from: MR Sella alone w/wo IV Cont (07.15.25 @ 12:05) >  IMPRESSION:        1.   4.1 cm CC by 2.4 cm TRV by 2.2 cm AP consistent with a pituitary   macroadenoma. Compression of the optic chiasm and extension into the   cranially with mass effect on the LEFT hypothalamus. Mild extension into   the RIGHT cavernous sinus without compression of the RIGHT cavernous   internal carotid artery.        2.   Minimal anterior periventricular and scattered bifrontal   subcortical chronic white matter ischemia.      < end of copied text >

## 2025-07-17 ENCOUNTER — TRANSCRIPTION ENCOUNTER (OUTPATIENT)
Age: 55
End: 2025-07-17

## 2025-07-17 VITALS
TEMPERATURE: 98 F | OXYGEN SATURATION: 100 % | DIASTOLIC BLOOD PRESSURE: 84 MMHG | SYSTOLIC BLOOD PRESSURE: 123 MMHG | HEART RATE: 72 BPM | RESPIRATION RATE: 18 BRPM

## 2025-07-17 LAB — ESTROGEN SERPL-MCNC: 115 PG/ML — SIGNIFICANT CHANGE UP (ref 56–213)

## 2025-07-17 RX ORDER — ACETAMINOPHEN 500 MG/5ML
2 LIQUID (ML) ORAL
Qty: 0 | Refills: 0 | DISCHARGE
Start: 2025-07-17

## 2025-07-17 RX ADMIN — Medication 650 MILLIGRAM(S): at 12:00

## 2025-07-17 RX ADMIN — Medication 650 MILLIGRAM(S): at 12:55

## 2025-07-17 NOTE — DISCHARGE NOTE PROVIDER - NSDCFUADDINST_GEN_ALL_CORE_FT
Gradually increase activities / walking. Do not lift anything heavier than a gallon of milk.     If you have any sudden change in the character or intensity of your headache, or you have any new changes in your vision, call Dr Avalos's office or visit the [facility] emergency room

## 2025-07-17 NOTE — DISCHARGE NOTE PROVIDER - NSDCADMDATE_GEN_ALL_CORE_FT
"              After Visit Summary   2018    Farooq Sorto    MRN: [id number]           Patient Information     Date Of Birth          1992        Visit Information        Provider Department      2018 3:00 PM Lakshmi Watts APRN CNP [facility]        Today's Diagnoses     Rectal irritation    -  1      Care Instructions    Balneol lotion to rectal area as needed for discomfort     If bleeding recurs may need to see him back           Follow-ups after your visit        Who to contact     If you have questions or need follow up information about today's clinic visit or your schedule please contact [facility] directly at [phone].  Normal or non-critical lab and imaging results will be communicated to you by MyChart, letter or phone within 4 business days after the clinic has received the results. If you do not hear from us within 7 days, please contact the clinic through MyChart or phone. If you have a critical or abnormal lab result, we will notify you by phone as soon as possible.  Submit refill requests through [facility] or call your pharmacy and they will forward the refill request to us. Please allow 3 business days for your refill to be completed.          Additional Information About Your Visit        MyChart Information     [facility] lets you send messages to your doctor, view your test results, renew your prescriptions, schedule appointments and more. To sign up, go to [email or web address] . Click on \"Log in\" on the left side of the screen, which will take you to the Welcome page. Then click on \"Sign up Now\" on the right side of the page.     You will be asked to enter the access code listed below, as well as some personal information. Please follow the directions to create your username and password.     Your access code is: [email or web address]  Expires: 3/19/2018  3:33 PM     Your access code will  in 90 days. If you need help or a new code, " "please call your [place] clinic or [phone].        Care EveryWhere ID     This is your Care EveryWhere ID. This could be used by other organizations to access your [place] medical records  [id number]        Your Vitals Were     Pulse Temperature Height Pulse Oximetry BMI (Body Mass Index)       97 98.6  F (37  C) (Oral) 5' 10.5\" (1.791 m) 94% 38.19 kg/m2        Blood Pressure from Last 3 Encounters:   02/21/18 118/76   12/20/17 116/78   12/19/17 126/81    Weight from Last 3 Encounters:   02/21/18 270 lb (122.5 kg)   12/20/17 270 lb 8 oz (122.7 kg)   10/10/17 279 lb 14.4 oz (127 kg)              Today, you had the following     No orders found for display         Today's Medication Changes          These changes are accurate as of 2/21/18  3:35 PM.  If you have any questions, ask your nurse or doctor.               Start taking these medicines.        Dose/Directions    balneol Lotn lotion   Used for:  Rectal irritation   Started by:  Lakshmi Watts APRN CNP        Apply to rectal area as needed for comfort   Quantity:  90 mL   Refills:  0            Where to get your medicines      These medications were sent to [facility] [address] [place], [place] [address] - [address] [facility] AT [facility]  [address] [place] [address]     Phone:  [phone]     balneol Lotn lotion                Primary Care Provider Office Phone # Fax #    Daina Tone Waldron [phone]       [facility] [address]        Equal Access to Services     [facility] AH: Hadii gladis Mccarthy, wachrystalda lumiguelina, qaybta kaaljanny crow. So [facility] [phone].    ATENCIÓN: Si habla español, tiene a eagle disposición servicios gratuitos de asistencia lingüística. Michael al [phone].    We comply with applicable federal civil rights laws and [place] laws. We do not discriminate on the basis of race, color, national origin, age, disability, " sex, sexual orientation, or gender identity.            Thank you!     Thank you for choosing [facility]  for your care. Our goal is always to provide you with excellent care. Hearing back from our patients is one way we can continue to improve our services. Please take a few minutes to complete the written survey that you may receive in the mail after your visit with us. Thank you!             Your Updated Medication List - Protect others around you: Learn how to safely use, store and throw away your medicines at www.disposemymeds.org.          This list is accurate as of 2/21/18  3:35 PM.  Always use your most recent med list.                   Brand Name Dispense Instructions for use Diagnosis    amantadine 100 MG capsule    SYMMETREL     Take 100 mg by mouth 2 times daily        balneol Lotn lotion     90 mL    Apply to rectal area as needed for comfort    Rectal irritation       benzoyl peroxide 10 % topical gel     28 g    Apply topically 2 times daily    Acne vulgaris       CLONAZEPAM PO      Take 0.5 mg by mouth        GUANFACINE HCL PO      Take 2 mg by mouth 2 times daily        hydrOXYzine 25 MG tablet    ATARAX    60 tablet    Take 1-2 tablets (25-50 mg) by mouth every 6 hours as needed for itching    Folliculitis       IBUPROFEN PO      Take 600 mg by mouth every 6 hours as needed for moderate pain        lamoTRIgine 200 MG tablet    LaMICtal     Take 500 mg by mouth 2 times daily        OLANZapine 10 MG tablet    zyPREXA    90 tablet    Take 1 tablet (10 mg) by mouth At Bedtime    Aggressive behavior, Agitation       ranitidine 150 MG tablet    ZANTAC    180 tablet    Take 1 tablet (150 mg) by mouth 2 times daily    Gastroesophageal reflux disease without esophagitis       VIMPAT PO      Take 200 mg by mouth 2 times daily           14-Jul-2025 22:02

## 2025-07-17 NOTE — DISCHARGE NOTE PROVIDER - CARE PROVIDER_API CALL
Roverto Avalos)  Neurological Surgery  [address] [place] [address]  Phone: [phone]  Fax: [phone]  Follow Up Time: 2 weeks

## 2025-07-17 NOTE — DISCHARGE NOTE PROVIDER - NSDCMRMEDTOKEN_GEN_ALL_CORE_FT
acetaminophen 325 mg oral tablet: 2 tab(s) orally every 6 hours As needed Mild Pain (1 - 3)  amLODIPine 5 mg oral tablet: 1 tab(s) orally once a day  Multiple Vitamins oral tablet: 1 tab(s) orally once a day

## 2025-07-17 NOTE — DISCHARGE NOTE NURSING/CASE MANAGEMENT/SOCIAL WORK - NSDCPEFALRISK_GEN_ALL_CORE
For information on Fall & Injury Prevention, visit: [email or web address] OR  [email or web address] OR  https://www.cdc.gov/steadi/patient.html

## 2025-07-17 NOTE — DISCHARGE NOTE PROVIDER - HOSPITAL COURSE
56 y/o male with no pertinent PMHx presents to the ED c/o constant headache for 4 days. Pt also c/o vision loss in left eye for months, states he only sees shapes. Denies left eye pain. Pt states he saw an ophthalmologist who said there is no problem with his eye, recommended MRI.  Presented with MR from [place] demonstrating a pitutiary mass.  CTH in ED confirms mass.  Patient with son at bedside for translation, denied  for this encounter.  States his left eye has had visual changes for about 1 year, no pain associated with visual loss. Headaches for about 4 days.  Denies fatigue, weakness, nausea, palpitations, hair loss.      7/15- Pt seen and examined on 3E, no events overnight, vision still limited in left eye, pt appears comfortable in bed, pending MRI today, Endocrine labs ordered.     7/16:  [id number], Patient seen and examined, wife at bedside.  MR imaging done and reviewed with patient and family.  Discussed surgical plan, no neurologic changes from baseline LT eye.  Dr Avalos will discuss with ENT about OR.

## 2025-07-17 NOTE — DISCHARGE NOTE NURSING/CASE MANAGEMENT/SOCIAL WORK - FINANCIAL ASSISTANCE
[facility] provides services at a reduced cost to those who are determined to be eligible through [facility]’s financial assistance program. Information regarding [facility]’s financial assistance program can be found by going to [email or web address] or by calling [phone].

## 2025-07-17 NOTE — DISCHARGE NOTE NURSING/CASE MANAGEMENT/SOCIAL WORK - PATIENT PORTAL LINK FT
You can access the FollowMyHealth Patient Portal offered by [facility] by registering at the following website: [email or web address]. By joining [facility]’s FollowMyHealth portal, you will also be able to view your health information using other applications (apps) compatible with our system.

## 2025-07-17 NOTE — DISCHARGE NOTE PROVIDER - NSDCCPCAREPLAN_GEN_ALL_CORE_FT
PRINCIPAL DISCHARGE DIAGNOSIS  Diagnosis: Pituitary adenoma  Assessment and Plan of Treatment:       SECONDARY DISCHARGE DIAGNOSES  Diagnosis: Hypertension  Assessment and Plan of Treatment:

## 2025-07-20 LAB — T4 SERPL-MCNC: 5.7 UG/DL — SIGNIFICANT CHANGE UP

## 2025-07-21 ENCOUNTER — RESULT REVIEW (OUTPATIENT)
Age: 55
End: 2025-07-21

## 2025-07-21 ENCOUNTER — INPATIENT (INPATIENT)
Facility: HOSPITAL | Age: 55
LOS: 3 days | Discharge: HOME | DRG: 103 | End: 2025-07-25
Attending: SPECIALIST | Admitting: SPECIALIST
Payer: COMMERCIAL

## 2025-07-21 VITALS
WEIGHT: 214.95 LBS | RESPIRATION RATE: 18 BRPM | OXYGEN SATURATION: 99 % | HEART RATE: 83 BPM | SYSTOLIC BLOOD PRESSURE: 148 MMHG | DIASTOLIC BLOOD PRESSURE: 80 MMHG | TEMPERATURE: 98 F

## 2025-07-21 DIAGNOSIS — R51.9 HEADACHE, UNSPECIFIED: ICD-10-CM

## 2025-07-21 DIAGNOSIS — Z71.89 OTHER SPECIFIED COUNSELING: ICD-10-CM

## 2025-07-21 PROBLEM — I10 ESSENTIAL (PRIMARY) HYPERTENSION: Chronic | Status: ACTIVE | Noted: 2025-07-14

## 2025-07-21 LAB
ALBUMIN SERPL ELPH-MCNC: 4.5 G/DL — SIGNIFICANT CHANGE UP (ref 3.3–5.2)
ALP SERPL-CCNC: 76 U/L — SIGNIFICANT CHANGE UP (ref 40–120)
ALT FLD-CCNC: 35 U/L — SIGNIFICANT CHANGE UP
ANION GAP SERPL CALC-SCNC: 12 MMOL/L — SIGNIFICANT CHANGE UP (ref 5–17)
APTT BLD: 28.2 SEC — SIGNIFICANT CHANGE UP (ref 26.1–36.8)
AST SERPL-CCNC: 36 U/L — SIGNIFICANT CHANGE UP
BILIRUB SERPL-MCNC: 0.3 MG/DL — LOW (ref 0.4–2)
BLD GP AB SCN SERPL QL: SIGNIFICANT CHANGE UP
BUN SERPL-MCNC: 16.5 MG/DL — SIGNIFICANT CHANGE UP (ref 8–20)
CALCIUM SERPL-MCNC: 9.4 MG/DL — SIGNIFICANT CHANGE UP (ref 8.4–10.5)
CHLORIDE SERPL-SCNC: 104 MMOL/L — SIGNIFICANT CHANGE UP (ref 96–108)
CO2 SERPL-SCNC: 24 MMOL/L — SIGNIFICANT CHANGE UP (ref 22–29)
CREAT SERPL-MCNC: 1.1 MG/DL — SIGNIFICANT CHANGE UP (ref 0.5–1.3)
EGFR: 80 ML/MIN/1.73M2 — SIGNIFICANT CHANGE UP
EGFR: 80 ML/MIN/1.73M2 — SIGNIFICANT CHANGE UP
GLUCOSE SERPL-MCNC: 149 MG/DL — HIGH (ref 70–99)
HCT VFR BLD CALC: 40.4 % — SIGNIFICANT CHANGE UP (ref 39–50)
HGB BLD-MCNC: 13.9 G/DL — SIGNIFICANT CHANGE UP (ref 13–17)
INR BLD: 0.97 RATIO — SIGNIFICANT CHANGE UP (ref 0.85–1.16)
MCHC RBC-ENTMCNC: 29.6 PG — SIGNIFICANT CHANGE UP (ref 27–34)
MCHC RBC-ENTMCNC: 34.4 G/DL — SIGNIFICANT CHANGE UP (ref 32–36)
MCV RBC AUTO: 86 FL — SIGNIFICANT CHANGE UP (ref 80–100)
MRSA PCR RESULT.: DETECTED
NRBC # BLD AUTO: 0 K/UL — SIGNIFICANT CHANGE UP (ref 0–0)
NRBC # FLD: 0 K/UL — SIGNIFICANT CHANGE UP (ref 0–0)
NRBC BLD AUTO-RTO: 0 /100 WBCS — SIGNIFICANT CHANGE UP (ref 0–0)
PLATELET # BLD AUTO: 183 K/UL — SIGNIFICANT CHANGE UP (ref 150–400)
PMV BLD: 12.5 FL — SIGNIFICANT CHANGE UP (ref 7–13)
POTASSIUM SERPL-MCNC: 4.8 MMOL/L — SIGNIFICANT CHANGE UP (ref 3.5–5.3)
POTASSIUM SERPL-SCNC: 4.8 MMOL/L — SIGNIFICANT CHANGE UP (ref 3.5–5.3)
PROT SERPL-MCNC: 7.8 G/DL — SIGNIFICANT CHANGE UP (ref 6.6–8.7)
PROTHROM AB SERPL-ACNC: 11.3 SEC — SIGNIFICANT CHANGE UP (ref 9.9–13.4)
RBC # BLD: 4.7 M/UL — SIGNIFICANT CHANGE UP (ref 4.2–5.8)
RBC # FLD: 13.2 % — SIGNIFICANT CHANGE UP (ref 10.3–14.5)
S AUREUS DNA NOSE QL NAA+PROBE: DETECTED
SODIUM SERPL-SCNC: 140 MMOL/L — SIGNIFICANT CHANGE UP (ref 135–145)
T3 SERPL-MCNC: 69 NG/DL — SIGNIFICANT CHANGE UP
WBC # BLD: 5.16 K/UL — SIGNIFICANT CHANGE UP (ref 3.8–10.5)
WBC # FLD AUTO: 5.16 K/UL — SIGNIFICANT CHANGE UP (ref 3.8–10.5)

## 2025-07-21 PROCEDURE — 86901 BLOOD TYPING SEROLOGIC RH(D): CPT

## 2025-07-21 PROCEDURE — 93306 TTE W/DOPPLER COMPLETE: CPT | Mod: 26

## 2025-07-21 PROCEDURE — 93970 EXTREMITY STUDY: CPT

## 2025-07-21 PROCEDURE — 99223 1ST HOSP IP/OBS HIGH 75: CPT

## 2025-07-21 PROCEDURE — 80053 COMPREHEN METABOLIC PANEL: CPT

## 2025-07-21 PROCEDURE — 71045 X-RAY EXAM CHEST 1 VIEW: CPT

## 2025-07-21 PROCEDURE — 86900 BLOOD TYPING SEROLOGIC ABO: CPT

## 2025-07-21 PROCEDURE — 93005 ELECTROCARDIOGRAM TRACING: CPT

## 2025-07-21 PROCEDURE — 99285 EMERGENCY DEPT VISIT HI MDM: CPT

## 2025-07-21 PROCEDURE — 87641 MR-STAPH DNA AMP PROBE: CPT

## 2025-07-21 PROCEDURE — 85027 COMPLETE CBC AUTOMATED: CPT

## 2025-07-21 PROCEDURE — 93970 EXTREMITY STUDY: CPT | Mod: 26

## 2025-07-21 PROCEDURE — 36415 COLL VENOUS BLD VENIPUNCTURE: CPT

## 2025-07-21 PROCEDURE — 71045 X-RAY EXAM CHEST 1 VIEW: CPT | Mod: 26

## 2025-07-21 PROCEDURE — 93010 ELECTROCARDIOGRAM REPORT: CPT

## 2025-07-21 PROCEDURE — 87640 STAPH A DNA AMP PROBE: CPT

## 2025-07-21 PROCEDURE — 86850 RBC ANTIBODY SCREEN: CPT

## 2025-07-21 PROCEDURE — 85730 THROMBOPLASTIN TIME PARTIAL: CPT

## 2025-07-21 PROCEDURE — 85610 PROTHROMBIN TIME: CPT

## 2025-07-21 RX ORDER — B1/B2/B3/B5/B6/B12/VIT C/FOLIC 500-0.5 MG
1 TABLET ORAL DAILY
Refills: 0 | Status: DISCONTINUED | OUTPATIENT
Start: 2025-07-21 | End: 2025-07-22

## 2025-07-21 RX ORDER — ACETAMINOPHEN 500 MG/5ML
650 LIQUID (ML) ORAL EVERY 6 HOURS
Refills: 0 | Status: DISCONTINUED | OUTPATIENT
Start: 2025-07-21 | End: 2025-07-22

## 2025-07-21 RX ORDER — POLYETHYLENE GLYCOL 3350 17 G/17G
17 POWDER, FOR SOLUTION ORAL DAILY
Refills: 0 | Status: DISCONTINUED | OUTPATIENT
Start: 2025-07-21 | End: 2025-07-22

## 2025-07-21 RX ORDER — LABETALOL HYDROCHLORIDE 200 MG/1
10 TABLET, FILM COATED ORAL
Refills: 0 | Status: DISCONTINUED | OUTPATIENT
Start: 2025-07-21 | End: 2025-07-22

## 2025-07-21 RX ORDER — CEFAZOLIN SODIUM IN 0.9 % NACL 3 G/100 ML
2000 INTRAVENOUS SOLUTION, PIGGYBACK (ML) INTRAVENOUS ONCE
Refills: 0 | Status: DISCONTINUED | OUTPATIENT
Start: 2025-07-21 | End: 2025-07-22

## 2025-07-21 RX ORDER — VANCOMYCIN HCL IN 5 % DEXTROSE 1.5G/250ML
1500 PLASTIC BAG, INJECTION (ML) INTRAVENOUS ONCE
Refills: 0 | Status: COMPLETED | OUTPATIENT
Start: 2025-07-21 | End: 2025-07-22

## 2025-07-21 RX ORDER — MUPIROCIN CALCIUM 20 MG/G
1 CREAM TOPICAL
Refills: 0 | Status: DISCONTINUED | OUTPATIENT
Start: 2025-07-21 | End: 2025-07-22

## 2025-07-21 RX ORDER — AMLODIPINE BESYLATE 10 MG/1
5 TABLET ORAL DAILY
Refills: 0 | Status: DISCONTINUED | OUTPATIENT
Start: 2025-07-21 | End: 2025-07-22

## 2025-07-21 RX ORDER — SENNA 187 MG
2 TABLET ORAL AT BEDTIME
Refills: 0 | Status: DISCONTINUED | OUTPATIENT
Start: 2025-07-21 | End: 2025-07-22

## 2025-07-21 RX ORDER — POVIDONE-IODINE 7.5 %
1 SOLUTION, NON-ORAL TOPICAL ONCE
Refills: 0 | Status: COMPLETED | OUTPATIENT
Start: 2025-07-22 | End: 2025-07-22

## 2025-07-21 RX ADMIN — Medication 15 MILLILITER(S): at 20:24

## 2025-07-21 RX ADMIN — Medication 10 MILLIGRAM(S): at 20:24

## 2025-07-22 ENCOUNTER — TRANSCRIPTION ENCOUNTER (OUTPATIENT)
Age: 55
End: 2025-07-22

## 2025-07-22 ENCOUNTER — RESULT REVIEW (OUTPATIENT)
Age: 55
End: 2025-07-22

## 2025-07-22 DIAGNOSIS — D35.2 BENIGN NEOPLASM OF PITUITARY GLAND: ICD-10-CM

## 2025-07-22 LAB
ANION GAP SERPL CALC-SCNC: 12 MMOL/L — SIGNIFICANT CHANGE UP (ref 5–17)
ANION GAP SERPL CALC-SCNC: 14 MMOL/L — SIGNIFICANT CHANGE UP (ref 5–17)
BUN SERPL-MCNC: 10.1 MG/DL — SIGNIFICANT CHANGE UP (ref 8–20)
BUN SERPL-MCNC: 10.9 MG/DL — SIGNIFICANT CHANGE UP (ref 8–20)
CALCIUM SERPL-MCNC: 9.1 MG/DL — SIGNIFICANT CHANGE UP (ref 8.4–10.5)
CALCIUM SERPL-MCNC: 9.2 MG/DL — SIGNIFICANT CHANGE UP (ref 8.4–10.5)
CHLORIDE SERPL-SCNC: 101 MMOL/L — SIGNIFICANT CHANGE UP (ref 96–108)
CHLORIDE SERPL-SCNC: 106 MMOL/L — SIGNIFICANT CHANGE UP (ref 96–108)
CO2 SERPL-SCNC: 21 MMOL/L — LOW (ref 22–29)
CO2 SERPL-SCNC: 23 MMOL/L — SIGNIFICANT CHANGE UP (ref 22–29)
CREAT SERPL-MCNC: 1.08 MG/DL — SIGNIFICANT CHANGE UP (ref 0.5–1.3)
CREAT SERPL-MCNC: 1.17 MG/DL — SIGNIFICANT CHANGE UP (ref 0.5–1.3)
EGFR: 75 ML/MIN/1.73M2 — SIGNIFICANT CHANGE UP
EGFR: 75 ML/MIN/1.73M2 — SIGNIFICANT CHANGE UP
EGFR: 82 ML/MIN/1.73M2 — SIGNIFICANT CHANGE UP
EGFR: 82 ML/MIN/1.73M2 — SIGNIFICANT CHANGE UP
GLUCOSE SERPL-MCNC: 142 MG/DL — HIGH (ref 70–99)
GLUCOSE SERPL-MCNC: 96 MG/DL — SIGNIFICANT CHANGE UP (ref 70–99)
HCT VFR BLD CALC: 40 % — SIGNIFICANT CHANGE UP (ref 39–50)
HGB BLD-MCNC: 13.4 G/DL — SIGNIFICANT CHANGE UP (ref 13–17)
MAGNESIUM SERPL-MCNC: 2.4 MG/DL — SIGNIFICANT CHANGE UP (ref 1.6–2.6)
MCHC RBC-ENTMCNC: 28.8 PG — SIGNIFICANT CHANGE UP (ref 27–34)
MCHC RBC-ENTMCNC: 33.5 G/DL — SIGNIFICANT CHANGE UP (ref 32–36)
MCV RBC AUTO: 85.8 FL — SIGNIFICANT CHANGE UP (ref 80–100)
NRBC # BLD AUTO: 0 K/UL — SIGNIFICANT CHANGE UP (ref 0–0)
NRBC # FLD: 0 K/UL — SIGNIFICANT CHANGE UP (ref 0–0)
NRBC BLD AUTO-RTO: 0 /100 WBCS — SIGNIFICANT CHANGE UP (ref 0–0)
PHOSPHATE SERPL-MCNC: 3.3 MG/DL — SIGNIFICANT CHANGE UP (ref 2.4–4.7)
PLATELET # BLD AUTO: 153 K/UL — SIGNIFICANT CHANGE UP (ref 150–400)
PMV BLD: 12.4 FL — SIGNIFICANT CHANGE UP (ref 7–13)
POTASSIUM SERPL-MCNC: 3.9 MMOL/L — SIGNIFICANT CHANGE UP (ref 3.5–5.3)
POTASSIUM SERPL-MCNC: 4.5 MMOL/L — SIGNIFICANT CHANGE UP (ref 3.5–5.3)
POTASSIUM SERPL-SCNC: 3.9 MMOL/L — SIGNIFICANT CHANGE UP (ref 3.5–5.3)
POTASSIUM SERPL-SCNC: 4.5 MMOL/L — SIGNIFICANT CHANGE UP (ref 3.5–5.3)
RBC # BLD: 4.66 M/UL — SIGNIFICANT CHANGE UP (ref 4.2–5.8)
RBC # FLD: 13.1 % — SIGNIFICANT CHANGE UP (ref 10.3–14.5)
SODIUM SERPL-SCNC: 136 MMOL/L — SIGNIFICANT CHANGE UP (ref 135–145)
SODIUM SERPL-SCNC: 141 MMOL/L — SIGNIFICANT CHANGE UP (ref 135–145)
TESTOST FREE SERPL-MCNC: 8.2 PG/ML — SIGNIFICANT CHANGE UP (ref 5.7–30.7)
WBC # BLD: 5.04 K/UL — SIGNIFICANT CHANGE UP (ref 3.8–10.5)
WBC # FLD AUTO: 5.04 K/UL — SIGNIFICANT CHANGE UP (ref 3.8–10.5)

## 2025-07-22 PROCEDURE — 86900 BLOOD TYPING SEROLOGIC ABO: CPT

## 2025-07-22 PROCEDURE — 87641 MR-STAPH DNA AMP PROBE: CPT

## 2025-07-22 PROCEDURE — 88305 TISSUE EXAM BY PATHOLOGIST: CPT | Mod: 26

## 2025-07-22 PROCEDURE — 83735 ASSAY OF MAGNESIUM: CPT

## 2025-07-22 PROCEDURE — 88313 SPECIAL STAINS GROUP 2: CPT | Mod: 26

## 2025-07-22 PROCEDURE — 70450 CT HEAD/BRAIN W/O DYE: CPT

## 2025-07-22 PROCEDURE — 71045 X-RAY EXAM CHEST 1 VIEW: CPT

## 2025-07-22 PROCEDURE — 86850 RBC ANTIBODY SCREEN: CPT

## 2025-07-22 PROCEDURE — 36415 COLL VENOUS BLD VENIPUNCTURE: CPT

## 2025-07-22 PROCEDURE — 87640 STAPH A DNA AMP PROBE: CPT

## 2025-07-22 PROCEDURE — 85027 COMPLETE CBC AUTOMATED: CPT

## 2025-07-22 PROCEDURE — 62165 REMOVE PITUIT TUMOR W/SCOPE: CPT | Mod: 62

## 2025-07-22 PROCEDURE — 84100 ASSAY OF PHOSPHORUS: CPT

## 2025-07-22 PROCEDURE — 70450 CT HEAD/BRAIN W/O DYE: CPT | Mod: 26

## 2025-07-22 PROCEDURE — 99223 1ST HOSP IP/OBS HIGH 75: CPT

## 2025-07-22 PROCEDURE — 93005 ELECTROCARDIOGRAM TRACING: CPT

## 2025-07-22 PROCEDURE — 88342 IMHCHEM/IMCYTCHM 1ST ANTB: CPT | Mod: 26

## 2025-07-22 PROCEDURE — 99291 CRITICAL CARE FIRST HOUR: CPT | Mod: 24

## 2025-07-22 PROCEDURE — 85730 THROMBOPLASTIN TIME PARTIAL: CPT

## 2025-07-22 PROCEDURE — 80053 COMPREHEN METABOLIC PANEL: CPT

## 2025-07-22 PROCEDURE — 80048 BASIC METABOLIC PNL TOTAL CA: CPT

## 2025-07-22 PROCEDURE — 99221 1ST HOSP IP/OBS SF/LOW 40: CPT

## 2025-07-22 PROCEDURE — 86901 BLOOD TYPING SEROLOGIC RH(D): CPT

## 2025-07-22 PROCEDURE — 61781 SCAN PROC CRANIAL INTRA: CPT

## 2025-07-22 PROCEDURE — 85610 PROTHROMBIN TIME: CPT

## 2025-07-22 PROCEDURE — 93970 EXTREMITY STUDY: CPT

## 2025-07-22 PROCEDURE — 88341 IMHCHEM/IMCYTCHM EA ADD ANTB: CPT | Mod: 26

## 2025-07-22 PROCEDURE — 88360 TUMOR IMMUNOHISTOCHEM/MANUAL: CPT | Mod: 26,59

## 2025-07-22 DEVICE — SURGICEL 2 X 14": Type: IMPLANTABLE DEVICE | Status: FUNCTIONAL

## 2025-07-22 DEVICE — SURGIFOAM 8 X 12.5CM X 10MM (100): Type: IMPLANTABLE DEVICE | Status: FUNCTIONAL

## 2025-07-22 DEVICE — GRAFT DURAL MATRX 3X3IN DURGN: Type: IMPLANTABLE DEVICE | Status: FUNCTIONAL

## 2025-07-22 DEVICE — SURGIFLO MATRIX WITH THROMBIN KIT: Type: IMPLANTABLE DEVICE | Status: FUNCTIONAL

## 2025-07-22 RX ORDER — LABETALOL HYDROCHLORIDE 200 MG/1
10 TABLET, FILM COATED ORAL
Refills: 0 | Status: DISCONTINUED | OUTPATIENT
Start: 2025-07-22 | End: 2025-07-24

## 2025-07-22 RX ORDER — OXYCODONE HYDROCHLORIDE 30 MG/1
10 TABLET ORAL EVERY 4 HOURS
Refills: 0 | Status: DISCONTINUED | OUTPATIENT
Start: 2025-07-22 | End: 2025-07-25

## 2025-07-22 RX ORDER — B1/B2/B3/B5/B6/B12/VIT C/FOLIC 500-0.5 MG
1 TABLET ORAL DAILY
Refills: 0 | Status: DISCONTINUED | OUTPATIENT
Start: 2025-07-23 | End: 2025-07-25

## 2025-07-22 RX ORDER — ACETAMINOPHEN 500 MG/5ML
975 LIQUID (ML) ORAL EVERY 6 HOURS
Refills: 0 | Status: DISCONTINUED | OUTPATIENT
Start: 2025-07-22 | End: 2025-07-25

## 2025-07-22 RX ORDER — POLYETHYLENE GLYCOL 3350 17 G/17G
17 POWDER, FOR SOLUTION ORAL DAILY
Refills: 0 | Status: DISCONTINUED | OUTPATIENT
Start: 2025-07-22 | End: 2025-07-25

## 2025-07-22 RX ORDER — ACETAMINOPHEN 500 MG/5ML
650 LIQUID (ML) ORAL EVERY 6 HOURS
Refills: 0 | Status: DISCONTINUED | OUTPATIENT
Start: 2025-07-22 | End: 2025-07-22

## 2025-07-22 RX ORDER — SENNA 187 MG
2 TABLET ORAL AT BEDTIME
Refills: 0 | Status: DISCONTINUED | OUTPATIENT
Start: 2025-07-22 | End: 2025-07-25

## 2025-07-22 RX ORDER — OXYCODONE HYDROCHLORIDE 30 MG/1
5 TABLET ORAL EVERY 4 HOURS
Refills: 0 | Status: DISCONTINUED | OUTPATIENT
Start: 2025-07-22 | End: 2025-07-25

## 2025-07-22 RX ORDER — CEFAZOLIN SODIUM IN 0.9 % NACL 3 G/100 ML
2000 INTRAVENOUS SOLUTION, PIGGYBACK (ML) INTRAVENOUS EVERY 8 HOURS
Refills: 0 | Status: COMPLETED | OUTPATIENT
Start: 2025-07-23 | End: 2025-07-23

## 2025-07-22 RX ORDER — HYDROMORPHONE/SOD CHLOR,ISO/PF 2 MG/10 ML
0.5 SYRINGE (ML) INJECTION EVERY 6 HOURS
Refills: 0 | Status: DISCONTINUED | OUTPATIENT
Start: 2025-07-22 | End: 2025-07-24

## 2025-07-22 RX ORDER — AMLODIPINE BESYLATE 10 MG/1
5 TABLET ORAL DAILY
Refills: 0 | Status: DISCONTINUED | OUTPATIENT
Start: 2025-07-23 | End: 2025-07-23

## 2025-07-22 RX ORDER — VANCOMYCIN HCL IN 5 % DEXTROSE 1.5G/250ML
1500 PLASTIC BAG, INJECTION (ML) INTRAVENOUS ONCE
Refills: 0 | Status: DISCONTINUED | OUTPATIENT
Start: 2025-07-22 | End: 2025-07-23

## 2025-07-22 RX ADMIN — MUPIROCIN CALCIUM 1 APPLICATION(S): 20 CREAM TOPICAL at 06:12

## 2025-07-22 RX ADMIN — Medication 650 MILLIGRAM(S): at 06:11

## 2025-07-22 RX ADMIN — OXYCODONE HYDROCHLORIDE 10 MILLIGRAM(S): 30 TABLET ORAL at 21:00

## 2025-07-22 RX ADMIN — AMLODIPINE BESYLATE 5 MILLIGRAM(S): 10 TABLET ORAL at 06:12

## 2025-07-22 RX ADMIN — Medication 0.5 MILLIGRAM(S): at 22:00

## 2025-07-22 RX ADMIN — Medication 300 MILLIGRAM(S): at 15:50

## 2025-07-22 RX ADMIN — Medication 65 MILLILITER(S): at 01:42

## 2025-07-22 RX ADMIN — Medication 15 MILLILITER(S): at 06:13

## 2025-07-22 RX ADMIN — OXYCODONE HYDROCHLORIDE 10 MILLIGRAM(S): 30 TABLET ORAL at 20:43

## 2025-07-22 RX ADMIN — Medication 65 MILLILITER(S): at 10:25

## 2025-07-22 RX ADMIN — Medication 1 TABLET(S): at 11:36

## 2025-07-22 RX ADMIN — Medication 0.5 MILLIGRAM(S): at 21:25

## 2025-07-22 RX ADMIN — Medication 1 APPLICATION(S): at 15:27

## 2025-07-23 LAB
ACETONE SERPL-MCNC: NEGATIVE — SIGNIFICANT CHANGE UP
ANION GAP SERPL CALC-SCNC: 14 MMOL/L — SIGNIFICANT CHANGE UP (ref 5–17)
ANION GAP SERPL CALC-SCNC: 14 MMOL/L — SIGNIFICANT CHANGE UP (ref 5–17)
ANION GAP SERPL CALC-SCNC: 15 MMOL/L — SIGNIFICANT CHANGE UP (ref 5–17)
ANION GAP SERPL CALC-SCNC: 15 MMOL/L — SIGNIFICANT CHANGE UP (ref 5–17)
BUN SERPL-MCNC: 12.9 MG/DL — SIGNIFICANT CHANGE UP (ref 8–20)
BUN SERPL-MCNC: 17.1 MG/DL — SIGNIFICANT CHANGE UP (ref 8–20)
BUN SERPL-MCNC: 19.1 MG/DL — SIGNIFICANT CHANGE UP (ref 8–20)
BUN SERPL-MCNC: 20.7 MG/DL — HIGH (ref 8–20)
CALCIUM SERPL-MCNC: 8.7 MG/DL — SIGNIFICANT CHANGE UP (ref 8.4–10.5)
CALCIUM SERPL-MCNC: 8.9 MG/DL — SIGNIFICANT CHANGE UP (ref 8.4–10.5)
CALCIUM SERPL-MCNC: 9.1 MG/DL — SIGNIFICANT CHANGE UP (ref 8.4–10.5)
CALCIUM SERPL-MCNC: 9.2 MG/DL — SIGNIFICANT CHANGE UP (ref 8.4–10.5)
CHLORIDE SERPL-SCNC: 102 MMOL/L — SIGNIFICANT CHANGE UP (ref 96–108)
CHLORIDE SERPL-SCNC: 103 MMOL/L — SIGNIFICANT CHANGE UP (ref 96–108)
CHLORIDE SERPL-SCNC: 104 MMOL/L — SIGNIFICANT CHANGE UP (ref 96–108)
CHLORIDE SERPL-SCNC: 99 MMOL/L — SIGNIFICANT CHANGE UP (ref 96–108)
CO2 SERPL-SCNC: 17 MMOL/L — LOW (ref 22–29)
CO2 SERPL-SCNC: 18 MMOL/L — LOW (ref 22–29)
CO2 SERPL-SCNC: 19 MMOL/L — LOW (ref 22–29)
CO2 SERPL-SCNC: 21 MMOL/L — LOW (ref 22–29)
CORTIS SERPL-MCNC: 20.7 UG/DL — SIGNIFICANT CHANGE UP
CREAT SERPL-MCNC: 1.16 MG/DL — SIGNIFICANT CHANGE UP (ref 0.5–1.3)
CREAT SERPL-MCNC: 1.35 MG/DL — HIGH (ref 0.5–1.3)
CREAT SERPL-MCNC: 1.35 MG/DL — HIGH (ref 0.5–1.3)
CREAT SERPL-MCNC: 1.42 MG/DL — HIGH (ref 0.5–1.3)
EGFR: 59 ML/MIN/1.73M2 — LOW
EGFR: 59 ML/MIN/1.73M2 — LOW
EGFR: 63 ML/MIN/1.73M2 — SIGNIFICANT CHANGE UP
EGFR: 75 ML/MIN/1.73M2 — SIGNIFICANT CHANGE UP
EGFR: 75 ML/MIN/1.73M2 — SIGNIFICANT CHANGE UP
GLUCOSE SERPL-MCNC: 101 MG/DL — HIGH (ref 70–99)
GLUCOSE SERPL-MCNC: 137 MG/DL — HIGH (ref 70–99)
GLUCOSE SERPL-MCNC: 139 MG/DL — HIGH (ref 70–99)
GLUCOSE SERPL-MCNC: 90 MG/DL — SIGNIFICANT CHANGE UP (ref 70–99)
HCT VFR BLD CALC: 39.5 % — SIGNIFICANT CHANGE UP (ref 39–50)
HGB BLD-MCNC: 13.6 G/DL — SIGNIFICANT CHANGE UP (ref 13–17)
LACTATE SERPL-SCNC: 2.5 MMOL/L — HIGH (ref 0.5–2)
MAGNESIUM SERPL-MCNC: 2.3 MG/DL — SIGNIFICANT CHANGE UP (ref 1.6–2.6)
MCHC RBC-ENTMCNC: 29.2 PG — SIGNIFICANT CHANGE UP (ref 27–34)
MCHC RBC-ENTMCNC: 34.4 G/DL — SIGNIFICANT CHANGE UP (ref 32–36)
MCV RBC AUTO: 84.9 FL — SIGNIFICANT CHANGE UP (ref 80–100)
NRBC # BLD AUTO: 0 K/UL — SIGNIFICANT CHANGE UP (ref 0–0)
NRBC # FLD: 0 K/UL — SIGNIFICANT CHANGE UP (ref 0–0)
NRBC BLD AUTO-RTO: 0 /100 WBCS — SIGNIFICANT CHANGE UP (ref 0–0)
PHOSPHATE SERPL-MCNC: 3.8 MG/DL — SIGNIFICANT CHANGE UP (ref 2.4–4.7)
PLATELET # BLD AUTO: 157 K/UL — SIGNIFICANT CHANGE UP (ref 150–400)
PMV BLD: 12 FL — SIGNIFICANT CHANGE UP (ref 7–13)
POTASSIUM SERPL-MCNC: 4.3 MMOL/L — SIGNIFICANT CHANGE UP (ref 3.5–5.3)
POTASSIUM SERPL-MCNC: 4.3 MMOL/L — SIGNIFICANT CHANGE UP (ref 3.5–5.3)
POTASSIUM SERPL-MCNC: 4.7 MMOL/L — SIGNIFICANT CHANGE UP (ref 3.5–5.3)
POTASSIUM SERPL-MCNC: 4.9 MMOL/L — SIGNIFICANT CHANGE UP (ref 3.5–5.3)
POTASSIUM SERPL-SCNC: 4.3 MMOL/L — SIGNIFICANT CHANGE UP (ref 3.5–5.3)
POTASSIUM SERPL-SCNC: 4.3 MMOL/L — SIGNIFICANT CHANGE UP (ref 3.5–5.3)
POTASSIUM SERPL-SCNC: 4.7 MMOL/L — SIGNIFICANT CHANGE UP (ref 3.5–5.3)
POTASSIUM SERPL-SCNC: 4.9 MMOL/L — SIGNIFICANT CHANGE UP (ref 3.5–5.3)
RBC # BLD: 4.65 M/UL — SIGNIFICANT CHANGE UP (ref 4.2–5.8)
RBC # FLD: 13 % — SIGNIFICANT CHANGE UP (ref 10.3–14.5)
SODIUM SERPL-SCNC: 132 MMOL/L — LOW (ref 135–145)
SODIUM SERPL-SCNC: 136 MMOL/L — SIGNIFICANT CHANGE UP (ref 135–145)
SODIUM SERPL-SCNC: 136 MMOL/L — SIGNIFICANT CHANGE UP (ref 135–145)
SODIUM SERPL-SCNC: 137 MMOL/L — SIGNIFICANT CHANGE UP (ref 135–145)
T3 SERPL-MCNC: 59 NG/DL — LOW (ref 80–200)
T4 FREE SERPL-MCNC: 0.9 NG/DL — SIGNIFICANT CHANGE UP (ref 0.9–1.8)
WBC # BLD: 8.94 K/UL — SIGNIFICANT CHANGE UP (ref 3.8–10.5)
WBC # FLD AUTO: 8.94 K/UL — SIGNIFICANT CHANGE UP (ref 3.8–10.5)

## 2025-07-23 PROCEDURE — 99232 SBSQ HOSP IP/OBS MODERATE 35: CPT

## 2025-07-23 RX ORDER — B1/B2/B3/B5/B6/B12/VIT C/FOLIC 500-0.5 MG
1 TABLET ORAL
Refills: 0 | DISCHARGE

## 2025-07-23 RX ORDER — ENOXAPARIN SODIUM 100 MG/ML
40 INJECTION SUBCUTANEOUS EVERY 24 HOURS
Refills: 0 | Status: DISCONTINUED | OUTPATIENT
Start: 2025-07-23 | End: 2025-07-24

## 2025-07-23 RX ORDER — AMLODIPINE BESYLATE 10 MG/1
1 TABLET ORAL
Refills: 0 | DISCHARGE

## 2025-07-23 RX ORDER — SODIUM CHLORIDE 0.65 %
1 AEROSOL, SPRAY (ML) NASAL
Refills: 0 | Status: DISCONTINUED | OUTPATIENT
Start: 2025-07-23 | End: 2025-07-25

## 2025-07-23 RX ORDER — AMLODIPINE BESYLATE 10 MG/1
5 TABLET ORAL DAILY
Refills: 0 | Status: DISCONTINUED | OUTPATIENT
Start: 2025-07-23 | End: 2025-07-25

## 2025-07-23 RX ADMIN — AMLODIPINE BESYLATE 5 MILLIGRAM(S): 10 TABLET ORAL at 05:40

## 2025-07-23 RX ADMIN — OXYCODONE HYDROCHLORIDE 10 MILLIGRAM(S): 30 TABLET ORAL at 12:15

## 2025-07-23 RX ADMIN — OXYCODONE HYDROCHLORIDE 10 MILLIGRAM(S): 30 TABLET ORAL at 18:34

## 2025-07-23 RX ADMIN — OXYCODONE HYDROCHLORIDE 10 MILLIGRAM(S): 30 TABLET ORAL at 04:34

## 2025-07-23 RX ADMIN — Medication 975 MILLIGRAM(S): at 05:40

## 2025-07-23 RX ADMIN — Medication 975 MILLIGRAM(S): at 06:30

## 2025-07-23 RX ADMIN — Medication 975 MILLIGRAM(S): at 01:00

## 2025-07-23 RX ADMIN — OXYCODONE HYDROCHLORIDE 10 MILLIGRAM(S): 30 TABLET ORAL at 19:30

## 2025-07-23 RX ADMIN — Medication 1 SPRAY(S): at 06:46

## 2025-07-23 RX ADMIN — ENOXAPARIN SODIUM 40 MILLIGRAM(S): 100 INJECTION SUBCUTANEOUS at 21:21

## 2025-07-23 RX ADMIN — OXYCODONE HYDROCHLORIDE 10 MILLIGRAM(S): 30 TABLET ORAL at 11:28

## 2025-07-23 RX ADMIN — Medication 2000 MILLIGRAM(S): at 11:29

## 2025-07-23 RX ADMIN — Medication 1 APPLICATION(S): at 12:25

## 2025-07-23 RX ADMIN — Medication 975 MILLIGRAM(S): at 00:05

## 2025-07-23 RX ADMIN — Medication 1 TABLET(S): at 11:29

## 2025-07-23 RX ADMIN — OXYCODONE HYDROCHLORIDE 10 MILLIGRAM(S): 30 TABLET ORAL at 05:00

## 2025-07-23 RX ADMIN — Medication 2000 MILLIGRAM(S): at 01:05

## 2025-07-24 ENCOUNTER — TRANSCRIPTION ENCOUNTER (OUTPATIENT)
Age: 55
End: 2025-07-24

## 2025-07-24 DIAGNOSIS — I12.9 HYPERTENSIVE CHRONIC KIDNEY DISEASE WITH STAGE 1 THROUGH STAGE 4 CHRONIC KIDNEY DISEASE, OR UNSPECIFIED CHRONIC KIDNEY DISEASE: ICD-10-CM

## 2025-07-24 DIAGNOSIS — N18.9 CHRONIC KIDNEY DISEASE, UNSPECIFIED: ICD-10-CM

## 2025-07-24 DIAGNOSIS — D35.2 BENIGN NEOPLASM OF PITUITARY GLAND: ICD-10-CM

## 2025-07-24 DIAGNOSIS — Z88.6 ALLERGY STATUS TO ANALGESIC AGENT: ICD-10-CM

## 2025-07-24 LAB
ANION GAP SERPL CALC-SCNC: 12 MMOL/L — SIGNIFICANT CHANGE UP (ref 5–17)
ANION GAP SERPL CALC-SCNC: 13 MMOL/L — SIGNIFICANT CHANGE UP (ref 5–17)
BUN SERPL-MCNC: 20 MG/DL — SIGNIFICANT CHANGE UP (ref 8–20)
BUN SERPL-MCNC: 26 MG/DL — HIGH (ref 8–20)
CALCIUM SERPL-MCNC: 8.6 MG/DL — SIGNIFICANT CHANGE UP (ref 8.4–10.5)
CALCIUM SERPL-MCNC: 9 MG/DL — SIGNIFICANT CHANGE UP (ref 8.4–10.5)
CHLORIDE SERPL-SCNC: 104 MMOL/L — SIGNIFICANT CHANGE UP (ref 96–108)
CHLORIDE SERPL-SCNC: 108 MMOL/L — SIGNIFICANT CHANGE UP (ref 96–108)
CO2 SERPL-SCNC: 21 MMOL/L — LOW (ref 22–29)
CO2 SERPL-SCNC: 22 MMOL/L — SIGNIFICANT CHANGE UP (ref 22–29)
CREAT SERPL-MCNC: 1.4 MG/DL — HIGH (ref 0.5–1.3)
CREAT SERPL-MCNC: 1.47 MG/DL — HIGH (ref 0.5–1.3)
EGFR: 57 ML/MIN/1.73M2 — LOW
EGFR: 57 ML/MIN/1.73M2 — LOW
EGFR: 60 ML/MIN/1.73M2 — SIGNIFICANT CHANGE UP
EGFR: 60 ML/MIN/1.73M2 — SIGNIFICANT CHANGE UP
GHBP SERPL-SCNC: 32 NG/ML — SIGNIFICANT CHANGE UP
GLUCOSE SERPL-MCNC: 103 MG/DL — HIGH (ref 70–99)
GLUCOSE SERPL-MCNC: 108 MG/DL — HIGH (ref 70–99)
HCT VFR BLD CALC: 37 % — LOW (ref 39–50)
HGB BLD-MCNC: 12.8 G/DL — LOW (ref 13–17)
MAGNESIUM SERPL-MCNC: 2.5 MG/DL — SIGNIFICANT CHANGE UP (ref 1.6–2.6)
MCHC RBC-ENTMCNC: 29.5 PG — SIGNIFICANT CHANGE UP (ref 27–34)
MCHC RBC-ENTMCNC: 34.6 G/DL — SIGNIFICANT CHANGE UP (ref 32–36)
MCV RBC AUTO: 85.3 FL — SIGNIFICANT CHANGE UP (ref 80–100)
NRBC # BLD AUTO: 0 K/UL — SIGNIFICANT CHANGE UP (ref 0–0)
NRBC # FLD: 0 K/UL — SIGNIFICANT CHANGE UP (ref 0–0)
NRBC BLD AUTO-RTO: 0 /100 WBCS — SIGNIFICANT CHANGE UP (ref 0–0)
PHOSPHATE SERPL-MCNC: 2.8 MG/DL — SIGNIFICANT CHANGE UP (ref 2.4–4.7)
PLATELET # BLD AUTO: 161 K/UL — SIGNIFICANT CHANGE UP (ref 150–400)
PMV BLD: 12 FL — SIGNIFICANT CHANGE UP (ref 7–13)
POTASSIUM SERPL-MCNC: 4.6 MMOL/L — SIGNIFICANT CHANGE UP (ref 3.5–5.3)
POTASSIUM SERPL-MCNC: 4.7 MMOL/L — SIGNIFICANT CHANGE UP (ref 3.5–5.3)
POTASSIUM SERPL-SCNC: 4.6 MMOL/L — SIGNIFICANT CHANGE UP (ref 3.5–5.3)
POTASSIUM SERPL-SCNC: 4.7 MMOL/L — SIGNIFICANT CHANGE UP (ref 3.5–5.3)
RBC # BLD: 4.34 M/UL — SIGNIFICANT CHANGE UP (ref 4.2–5.8)
RBC # FLD: 13.2 % — SIGNIFICANT CHANGE UP (ref 10.3–14.5)
SODIUM SERPL-SCNC: 138 MMOL/L — SIGNIFICANT CHANGE UP (ref 135–145)
SODIUM SERPL-SCNC: 142 MMOL/L — SIGNIFICANT CHANGE UP (ref 135–145)
WBC # BLD: 11.63 K/UL — HIGH (ref 3.8–10.5)
WBC # FLD AUTO: 11.63 K/UL — HIGH (ref 3.8–10.5)

## 2025-07-24 PROCEDURE — C1889: CPT

## 2025-07-24 PROCEDURE — 70553 MRI BRAIN STEM W/O & W/DYE: CPT | Mod: 26

## 2025-07-24 PROCEDURE — 36415 COLL VENOUS BLD VENIPUNCTURE: CPT

## 2025-07-24 PROCEDURE — 93306 TTE W/DOPPLER COMPLETE: CPT

## 2025-07-24 PROCEDURE — 71045 X-RAY EXAM CHEST 1 VIEW: CPT

## 2025-07-24 PROCEDURE — 80048 BASIC METABOLIC PNL TOTAL CA: CPT

## 2025-07-24 PROCEDURE — 84439 ASSAY OF FREE THYROXINE: CPT

## 2025-07-24 PROCEDURE — 80053 COMPREHEN METABOLIC PANEL: CPT

## 2025-07-24 PROCEDURE — 86900 BLOOD TYPING SEROLOGIC ABO: CPT

## 2025-07-24 PROCEDURE — 85730 THROMBOPLASTIN TIME PARTIAL: CPT

## 2025-07-24 PROCEDURE — 82533 TOTAL CORTISOL: CPT

## 2025-07-24 PROCEDURE — 83605 ASSAY OF LACTIC ACID: CPT

## 2025-07-24 PROCEDURE — 84100 ASSAY OF PHOSPHORUS: CPT

## 2025-07-24 PROCEDURE — 87640 STAPH A DNA AMP PROBE: CPT

## 2025-07-24 PROCEDURE — 85027 COMPLETE CBC AUTOMATED: CPT

## 2025-07-24 PROCEDURE — 93005 ELECTROCARDIOGRAM TRACING: CPT

## 2025-07-24 PROCEDURE — 86901 BLOOD TYPING SEROLOGIC RH(D): CPT

## 2025-07-24 PROCEDURE — 84480 ASSAY TRIIODOTHYRONINE (T3): CPT

## 2025-07-24 PROCEDURE — 99232 SBSQ HOSP IP/OBS MODERATE 35: CPT

## 2025-07-24 PROCEDURE — 93970 EXTREMITY STUDY: CPT

## 2025-07-24 PROCEDURE — C1763: CPT

## 2025-07-24 PROCEDURE — 97167 OT EVAL HIGH COMPLEX 60 MIN: CPT

## 2025-07-24 PROCEDURE — 70450 CT HEAD/BRAIN W/O DYE: CPT

## 2025-07-24 PROCEDURE — 82009 KETONE BODYS QUAL: CPT

## 2025-07-24 PROCEDURE — 83735 ASSAY OF MAGNESIUM: CPT

## 2025-07-24 PROCEDURE — A9579: CPT

## 2025-07-24 PROCEDURE — 97163 PT EVAL HIGH COMPLEX 45 MIN: CPT

## 2025-07-24 PROCEDURE — 86850 RBC ANTIBODY SCREEN: CPT

## 2025-07-24 PROCEDURE — 70553 MRI BRAIN STEM W/O & W/DYE: CPT

## 2025-07-24 PROCEDURE — 85610 PROTHROMBIN TIME: CPT

## 2025-07-24 PROCEDURE — 87641 MR-STAPH DNA AMP PROBE: CPT

## 2025-07-24 RX ORDER — SODIUM PHOSPHATE,DIBASIC DIHYD
15 POWDER (GRAM) MISCELLANEOUS ONCE
Refills: 0 | Status: COMPLETED | OUTPATIENT
Start: 2025-07-24 | End: 2025-07-24

## 2025-07-24 RX ORDER — HEPARIN SODIUM 1000 [USP'U]/ML
5000 INJECTION INTRAVENOUS; SUBCUTANEOUS EVERY 8 HOURS
Refills: 0 | Status: DISCONTINUED | OUTPATIENT
Start: 2025-07-24 | End: 2025-07-25

## 2025-07-24 RX ADMIN — POLYETHYLENE GLYCOL 3350 17 GRAM(S): 17 POWDER, FOR SOLUTION ORAL at 12:00

## 2025-07-24 RX ADMIN — HEPARIN SODIUM 5000 UNIT(S): 1000 INJECTION INTRAVENOUS; SUBCUTANEOUS at 21:51

## 2025-07-24 RX ADMIN — Medication 1 TABLET(S): at 12:00

## 2025-07-24 RX ADMIN — Medication 975 MILLIGRAM(S): at 16:15

## 2025-07-24 RX ADMIN — OXYCODONE HYDROCHLORIDE 10 MILLIGRAM(S): 30 TABLET ORAL at 16:14

## 2025-07-24 RX ADMIN — Medication 1 APPLICATION(S): at 12:01

## 2025-07-24 RX ADMIN — OXYCODONE HYDROCHLORIDE 10 MILLIGRAM(S): 30 TABLET ORAL at 04:00

## 2025-07-24 RX ADMIN — Medication 63.75 MILLIMOLE(S): at 06:40

## 2025-07-24 RX ADMIN — OXYCODONE HYDROCHLORIDE 10 MILLIGRAM(S): 30 TABLET ORAL at 03:20

## 2025-07-24 RX ADMIN — AMLODIPINE BESYLATE 5 MILLIGRAM(S): 10 TABLET ORAL at 05:06

## 2025-07-24 RX ADMIN — Medication 2 TABLET(S): at 21:51

## 2025-07-25 ENCOUNTER — TRANSCRIPTION ENCOUNTER (OUTPATIENT)
Age: 55
End: 2025-07-25

## 2025-07-25 VITALS
OXYGEN SATURATION: 94 % | SYSTOLIC BLOOD PRESSURE: 117 MMHG | DIASTOLIC BLOOD PRESSURE: 74 MMHG | HEART RATE: 66 BPM | RESPIRATION RATE: 17 BRPM

## 2025-07-25 LAB
ANION GAP SERPL CALC-SCNC: 11 MMOL/L — SIGNIFICANT CHANGE UP (ref 5–17)
BUN SERPL-MCNC: 24.5 MG/DL — HIGH (ref 8–20)
CALCIUM SERPL-MCNC: 9 MG/DL — SIGNIFICANT CHANGE UP (ref 8.4–10.5)
CHLORIDE SERPL-SCNC: 108 MMOL/L — SIGNIFICANT CHANGE UP (ref 96–108)
CO2 SERPL-SCNC: 25 MMOL/L — SIGNIFICANT CHANGE UP (ref 22–29)
CREAT SERPL-MCNC: 1.32 MG/DL — HIGH (ref 0.5–1.3)
EGFR: 64 ML/MIN/1.73M2 — SIGNIFICANT CHANGE UP
EGFR: 64 ML/MIN/1.73M2 — SIGNIFICANT CHANGE UP
GLUCOSE SERPL-MCNC: 93 MG/DL — SIGNIFICANT CHANGE UP (ref 70–99)
HCT VFR BLD CALC: 37.2 % — LOW (ref 39–50)
HGB BLD-MCNC: 12.4 G/DL — LOW (ref 13–17)
MAGNESIUM SERPL-MCNC: 2.4 MG/DL — SIGNIFICANT CHANGE UP (ref 1.6–2.6)
MCHC RBC-ENTMCNC: 29.2 PG — SIGNIFICANT CHANGE UP (ref 27–34)
MCHC RBC-ENTMCNC: 33.3 G/DL — SIGNIFICANT CHANGE UP (ref 32–36)
MCV RBC AUTO: 87.7 FL — SIGNIFICANT CHANGE UP (ref 80–100)
NRBC # BLD AUTO: 0 K/UL — SIGNIFICANT CHANGE UP (ref 0–0)
NRBC # FLD: 0 K/UL — SIGNIFICANT CHANGE UP (ref 0–0)
NRBC BLD AUTO-RTO: 0 /100 WBCS — SIGNIFICANT CHANGE UP (ref 0–0)
PHOSPHATE SERPL-MCNC: 2.8 MG/DL — SIGNIFICANT CHANGE UP (ref 2.4–4.7)
PLATELET # BLD AUTO: 158 K/UL — SIGNIFICANT CHANGE UP (ref 150–400)
PMV BLD: 12.5 FL — SIGNIFICANT CHANGE UP (ref 7–13)
POTASSIUM SERPL-MCNC: 4.4 MMOL/L — SIGNIFICANT CHANGE UP (ref 3.5–5.3)
POTASSIUM SERPL-SCNC: 4.4 MMOL/L — SIGNIFICANT CHANGE UP (ref 3.5–5.3)
RBC # BLD: 4.24 M/UL — SIGNIFICANT CHANGE UP (ref 4.2–5.8)
RBC # FLD: 13.6 % — SIGNIFICANT CHANGE UP (ref 10.3–14.5)
SODIUM SERPL-SCNC: 144 MMOL/L — SIGNIFICANT CHANGE UP (ref 135–145)
WBC # BLD: 6.5 K/UL — SIGNIFICANT CHANGE UP (ref 3.8–10.5)
WBC # FLD AUTO: 6.5 K/UL — SIGNIFICANT CHANGE UP (ref 3.8–10.5)

## 2025-07-25 PROCEDURE — 86901 BLOOD TYPING SEROLOGIC RH(D): CPT

## 2025-07-25 PROCEDURE — C1763: CPT

## 2025-07-25 PROCEDURE — 36415 COLL VENOUS BLD VENIPUNCTURE: CPT

## 2025-07-25 PROCEDURE — 83605 ASSAY OF LACTIC ACID: CPT

## 2025-07-25 PROCEDURE — 99285 EMERGENCY DEPT VISIT HI MDM: CPT

## 2025-07-25 PROCEDURE — 87640 STAPH A DNA AMP PROBE: CPT

## 2025-07-25 PROCEDURE — 70553 MRI BRAIN STEM W/O & W/DYE: CPT

## 2025-07-25 PROCEDURE — 86850 RBC ANTIBODY SCREEN: CPT

## 2025-07-25 PROCEDURE — 99232 SBSQ HOSP IP/OBS MODERATE 35: CPT

## 2025-07-25 PROCEDURE — 97167 OT EVAL HIGH COMPLEX 60 MIN: CPT

## 2025-07-25 PROCEDURE — 85730 THROMBOPLASTIN TIME PARTIAL: CPT

## 2025-07-25 PROCEDURE — 88305 TISSUE EXAM BY PATHOLOGIST: CPT

## 2025-07-25 PROCEDURE — C1889: CPT

## 2025-07-25 PROCEDURE — 87641 MR-STAPH DNA AMP PROBE: CPT

## 2025-07-25 PROCEDURE — 86900 BLOOD TYPING SEROLOGIC ABO: CPT

## 2025-07-25 PROCEDURE — 82533 TOTAL CORTISOL: CPT

## 2025-07-25 PROCEDURE — 70450 CT HEAD/BRAIN W/O DYE: CPT

## 2025-07-25 PROCEDURE — 80048 BASIC METABOLIC PNL TOTAL CA: CPT

## 2025-07-25 PROCEDURE — 93970 EXTREMITY STUDY: CPT

## 2025-07-25 PROCEDURE — 84439 ASSAY OF FREE THYROXINE: CPT

## 2025-07-25 PROCEDURE — 71045 X-RAY EXAM CHEST 1 VIEW: CPT

## 2025-07-25 PROCEDURE — 84100 ASSAY OF PHOSPHORUS: CPT

## 2025-07-25 PROCEDURE — 83735 ASSAY OF MAGNESIUM: CPT

## 2025-07-25 PROCEDURE — 97163 PT EVAL HIGH COMPLEX 45 MIN: CPT

## 2025-07-25 PROCEDURE — 82009 KETONE BODYS QUAL: CPT

## 2025-07-25 PROCEDURE — 85610 PROTHROMBIN TIME: CPT

## 2025-07-25 PROCEDURE — 93306 TTE W/DOPPLER COMPLETE: CPT

## 2025-07-25 PROCEDURE — 85027 COMPLETE CBC AUTOMATED: CPT

## 2025-07-25 PROCEDURE — 84480 ASSAY TRIIODOTHYRONINE (T3): CPT

## 2025-07-25 PROCEDURE — 88342 IMHCHEM/IMCYTCHM 1ST ANTB: CPT

## 2025-07-25 PROCEDURE — 80053 COMPREHEN METABOLIC PANEL: CPT

## 2025-07-25 PROCEDURE — 88360 TUMOR IMMUNOHISTOCHEM/MANUAL: CPT

## 2025-07-25 PROCEDURE — 88341 IMHCHEM/IMCYTCHM EA ADD ANTB: CPT

## 2025-07-25 PROCEDURE — 88313 SPECIAL STAINS GROUP 2: CPT

## 2025-07-25 PROCEDURE — 93005 ELECTROCARDIOGRAM TRACING: CPT

## 2025-07-25 PROCEDURE — A9579: CPT

## 2025-07-25 RX ORDER — POLYETHYLENE GLYCOL 3350 17 G/17G
17 POWDER, FOR SOLUTION ORAL
Qty: 0 | Refills: 0 | DISCHARGE
Start: 2025-07-25

## 2025-07-25 RX ORDER — ACETAMINOPHEN 500 MG/5ML
3 LIQUID (ML) ORAL
Qty: 0 | Refills: 0 | DISCHARGE
Start: 2025-07-25

## 2025-07-25 RX ORDER — SENNA 187 MG
2 TABLET ORAL
Qty: 0 | Refills: 0 | DISCHARGE
Start: 2025-07-25

## 2025-07-25 RX ADMIN — Medication 1 APPLICATION(S): at 12:20

## 2025-07-25 RX ADMIN — Medication 1 TABLET(S): at 12:22

## 2025-07-25 RX ADMIN — AMLODIPINE BESYLATE 5 MILLIGRAM(S): 10 TABLET ORAL at 05:50

## 2025-07-25 RX ADMIN — Medication 975 MILLIGRAM(S): at 05:49

## 2025-07-25 RX ADMIN — HEPARIN SODIUM 5000 UNIT(S): 1000 INJECTION INTRAVENOUS; SUBCUTANEOUS at 05:50

## 2025-07-25 RX ADMIN — Medication 975 MILLIGRAM(S): at 06:49

## 2025-07-29 ENCOUNTER — APPOINTMENT (OUTPATIENT)
Dept: OTOLARYNGOLOGY | Facility: CLINIC | Age: 55
End: 2025-07-29

## 2025-07-29 VITALS
HEIGHT: 68 IN | BODY MASS INDEX: 32.13 KG/M2 | DIASTOLIC BLOOD PRESSURE: 82 MMHG | WEIGHT: 212 LBS | HEART RATE: 73 BPM | SYSTOLIC BLOOD PRESSURE: 142 MMHG

## 2025-07-29 LAB — SURGICAL PATHOLOGY STUDY: SIGNIFICANT CHANGE UP

## 2025-07-29 PROCEDURE — 99024 POSTOP FOLLOW-UP VISIT: CPT

## 2025-07-29 PROCEDURE — 31237 NSL/SINS NDSC SURG BX POLYPC: CPT | Mod: 50,78

## 2025-08-18 ENCOUNTER — NON-APPOINTMENT (OUTPATIENT)
Age: 55
End: 2025-08-18

## 2025-08-18 ENCOUNTER — APPOINTMENT (OUTPATIENT)
Dept: NEUROSURGERY | Facility: CLINIC | Age: 55
End: 2025-08-18

## 2025-08-18 VITALS
HEIGHT: 68 IN | HEART RATE: 89 BPM | BODY MASS INDEX: 32.74 KG/M2 | WEIGHT: 216 LBS | SYSTOLIC BLOOD PRESSURE: 153 MMHG | RESPIRATION RATE: 16 BRPM | DIASTOLIC BLOOD PRESSURE: 92 MMHG | OXYGEN SATURATION: 100 %

## 2025-08-18 VITALS — SYSTOLIC BLOOD PRESSURE: 138 MMHG | DIASTOLIC BLOOD PRESSURE: 82 MMHG

## 2025-08-18 DIAGNOSIS — D35.2 BENIGN NEOPLASM OF PITUITARY GLAND: ICD-10-CM

## 2025-08-18 PROCEDURE — 99024 POSTOP FOLLOW-UP VISIT: CPT

## 2025-08-29 ENCOUNTER — APPOINTMENT (OUTPATIENT)
Dept: ENDOCRINOLOGY | Facility: CLINIC | Age: 55
End: 2025-08-29

## 2025-09-02 PROBLEM — Z98.890 STATUS POST SELECTIVE TRANSSPHENOIDAL PITUITARY ADENOMECTOMY: Status: ACTIVE | Noted: 2025-09-02

## 2025-09-16 ENCOUNTER — APPOINTMENT (OUTPATIENT)
Dept: ENDOCRINOLOGY | Facility: CLINIC | Age: 55
End: 2025-09-16
Payer: COMMERCIAL

## 2025-09-16 VITALS
BODY MASS INDEX: 32.58 KG/M2 | HEIGHT: 68 IN | DIASTOLIC BLOOD PRESSURE: 81 MMHG | WEIGHT: 215 LBS | OXYGEN SATURATION: 100 % | SYSTOLIC BLOOD PRESSURE: 123 MMHG | HEART RATE: 80 BPM

## 2025-09-16 PROCEDURE — G2211 COMPLEX E/M VISIT ADD ON: CPT | Mod: NC

## 2025-09-16 PROCEDURE — 99215 OFFICE O/P EST HI 40 MIN: CPT

## 2025-09-16 RX ORDER — AMLODIPINE BESYLATE 5 MG/1
5 TABLET ORAL
Refills: 0 | Status: ACTIVE | COMMUNITY

## 2025-09-25 LAB
ACTH SER-ACNC: 35.4 PG/ML
ALBUMIN SERPL ELPH-MCNC: 4.7 G/DL
ALP BLD-CCNC: 85 U/L
ALT SERPL-CCNC: 171 U/L
ANION GAP SERPL CALC-SCNC: 17 MMOL/L
AST SERPL-CCNC: 85 U/L
BILIRUB SERPL-MCNC: 0.6 MG/DL
BUN SERPL-MCNC: 18 MG/DL
CALCIUM SERPL-MCNC: 9.7 MG/DL
CHLORIDE SERPL-SCNC: 106 MMOL/L
CO2 SERPL-SCNC: 22 MMOL/L
CORTIS SERPL-MCNC: 9.4 UG/DL
CREAT SERPL-MCNC: 1.17 MG/DL
EGFRCR SERPLBLD CKD-EPI 2021: 75 ML/MIN/1.73M2
FSH SERPL-MCNC: 3.6 IU/L
GH SERPL-MCNC: 0.08 NG/ML
GLUCOSE SERPL-MCNC: 99 MG/DL
LH SERPL-ACNC: 3.7 IU/L
POTASSIUM SERPL-SCNC: 4.2 MMOL/L
PROGEST SERPL-MCNC: <0.1 NG/ML
PROLACTIN SERPL-MCNC: 9.1 NG/ML
PROT SERPL-MCNC: 7.8 G/DL
SHBG SERPL-SCNC: 25.7 NMOL/L
SODIUM SERPL-SCNC: 146 MMOL/L
T4 FREE SERPL-MCNC: 1.1 NG/DL
TESTOST FREE SERPL-MCNC: 0.1 PG/ML
TESTOST SERPL-MCNC: 51.7 NG/DL
TSH SERPL-ACNC: 3 UIU/ML

## 2025-09-26 LAB — IGF BP1 SERPL-MCNC: 65 NG/ML

## 2025-09-28 LAB — ESTRADIOL ULTRASENSITIVE: 6.1 PG/ML

## (undated) DEVICE — STAPLER SKIN PROXIMATE

## (undated) DEVICE — DRAPE 1/2 SHEET 40X57"

## (undated) DEVICE — TUBING SUCTION 20FT

## (undated) DEVICE — SOL IRR POUR H2O 250ML

## (undated) DEVICE — Device

## (undated) DEVICE — BLADE MEDTRONIC ENT TRICUT ROTATABLE STRAIGHT 4MM X 11CM

## (undated) DEVICE — CLEANING SHEATH ENDO-SCRUB FOR STORZ 7210AA TELESCOPE 4MM 0 DEGREE

## (undated) DEVICE — BIPOLAR FORCEP SYMMETRY BAYONET 7" X 1.5MM SMOOTH (SILVER)

## (undated) DEVICE — DRSG MEROCEL STANDARD NO STRING 8CM X 2CM

## (undated) DEVICE — BUR MEDTRONIC ENT TRANSNASAL SKULL BASE DIAMOND ROUND 15 DEGREE 4.5MM X 13CM

## (undated) DEVICE — DRAPE SPLIT SHEET 77" X 108"

## (undated) DEVICE — GOWN TRIMAX LG

## (undated) DEVICE — DRSG 4 X 8"

## (undated) DEVICE — POSITIONER FOAM EGG CRATE ULNAR 2PCS (PINK)

## (undated) DEVICE — STORZ DURA MICRO KNIFE INSERT SICKLE-SHAPED

## (undated) DEVICE — MEDICATION LABELS W MARKER

## (undated) DEVICE — MARKING PEN W RULER

## (undated) DEVICE — MEDTRONIC INSTRUMENT TRACKER ENT

## (undated) DEVICE — SEALER BIPOLAR MIS FLEX AQUAMANTYS

## (undated) DEVICE — MEDTRONIC AXIEM PATIENT TRACKER NON-INVASIVE

## (undated) DEVICE — SOL IRR POUR NS 0.9% 1000ML

## (undated) DEVICE — PACK NEURO

## (undated) DEVICE — VENODYNE/SCD SLEEVE CALF LARGE

## (undated) DEVICE — ELCTR BOVIE TIP NEEDLE INSULATED 2.8" EDGE

## (undated) DEVICE — DRAPE INSTRUMENT POUCH 6.75" X 11"

## (undated) DEVICE — DRAPE 3/4 SHEET W REINFORCEMENT 56X77"

## (undated) DEVICE — MIDAS REX MR8 CLEARVIEW TN BALL 3MM X 13CM COARSE

## (undated) DEVICE — SPECIMEN CONTAINER 100ML

## (undated) DEVICE — FOLEY TRAY 16FR 5CC LTX UMETER CLOSED

## (undated) DEVICE — ELCTR ROCKER SWITCH PENCIL BLUE 10FT

## (undated) DEVICE — WARMING BLANKET LOWER ADULT

## (undated) DEVICE — SYR LUER LOK 10CC

## (undated) DEVICE — DRSG TELFA 3 X 8